# Patient Record
Sex: FEMALE | Race: BLACK OR AFRICAN AMERICAN | Employment: UNEMPLOYED | URBAN - METROPOLITAN AREA
[De-identification: names, ages, dates, MRNs, and addresses within clinical notes are randomized per-mention and may not be internally consistent; named-entity substitution may affect disease eponyms.]

---

## 2017-02-22 ENCOUNTER — HOSPITAL ENCOUNTER (OUTPATIENT)
Age: 47
Setting detail: SPECIMEN
Discharge: HOME | End: 2017-02-22
Payer: COMMERCIAL

## 2017-02-22 ENCOUNTER — OFFICE VISIT (OUTPATIENT)
Dept: FAMILY MEDICINE CLINIC | Facility: CLINIC | Age: 47
End: 2017-02-22

## 2017-02-22 VITALS
OXYGEN SATURATION: 99 % | SYSTOLIC BLOOD PRESSURE: 121 MMHG | HEART RATE: 63 BPM | BODY MASS INDEX: 28.98 KG/M2 | DIASTOLIC BLOOD PRESSURE: 72 MMHG | HEIGHT: 70 IN | WEIGHT: 202.4 LBS | TEMPERATURE: 96.6 F

## 2017-02-22 DIAGNOSIS — F17.200 SMOKES AND MOTIVATED TO QUIT: ICD-10-CM

## 2017-02-22 DIAGNOSIS — E66.3 OVERWEIGHT (BMI 25.0-29.9): ICD-10-CM

## 2017-02-22 DIAGNOSIS — N30.01 ACUTE CYSTITIS WITH HEMATURIA: ICD-10-CM

## 2017-02-22 DIAGNOSIS — R30.9 PAINFUL URINATION: ICD-10-CM

## 2017-02-22 DIAGNOSIS — J45.40 MODERATE PERSISTENT ASTHMA WITHOUT COMPLICATION: Primary | ICD-10-CM

## 2017-02-22 DIAGNOSIS — R25.2 MUSCLE CRAMPING: ICD-10-CM

## 2017-02-22 DIAGNOSIS — N30.00 ACUTE CYSTITIS WITHOUT HEMATURIA: ICD-10-CM

## 2017-02-22 DIAGNOSIS — Z13.31 POSITIVE DEPRESSION SCREENING: ICD-10-CM

## 2017-02-22 DIAGNOSIS — K21.9 GASTROESOPHAGEAL REFLUX DISEASE WITHOUT ESOPHAGITIS: ICD-10-CM

## 2017-02-22 DIAGNOSIS — R63.5 UNINTENDED WEIGHT GAIN: ICD-10-CM

## 2017-02-22 LAB
BILIRUBIN, POC: NEGATIVE
BLOOD URINE, POC: NORMAL
CLARITY, POC: CLEAR
COLOR, POC: YELLOW
GLUCOSE URINE, POC: NEGATIVE
KETONES, POC: NEGATIVE
LEUKOCYTE EST, POC: NORMAL
NITRITE, POC: NEGATIVE
PH, POC: 6
PROTEIN, POC: NORMAL
SPECIFIC GRAVITY, POC: 1.02
UROBILINOGEN, POC: NEGATIVE

## 2017-02-22 PROCEDURE — G8431 POS CLIN DEPRES SCRN F/U DOC: HCPCS | Performed by: FAMILY MEDICINE

## 2017-02-22 PROCEDURE — 99214 OFFICE O/P EST MOD 30 MIN: CPT | Performed by: FAMILY MEDICINE

## 2017-02-22 PROCEDURE — 81003 URINALYSIS AUTO W/O SCOPE: CPT | Performed by: FAMILY MEDICINE

## 2017-02-22 RX ORDER — NICOTINE 21 MG/24HR
1 PATCH, TRANSDERMAL 24 HOURS TRANSDERMAL EVERY 24 HOURS
Qty: 30 PATCH | Refills: 1 | Status: SHIPPED | OUTPATIENT
Start: 2017-02-22 | End: 2017-07-21 | Stop reason: SDUPTHER

## 2017-02-22 RX ORDER — FLUTICASONE PROPIONATE 220 UG/1
2 AEROSOL, METERED RESPIRATORY (INHALATION) 2 TIMES DAILY
Qty: 1 INHALER | Refills: 3 | Status: SHIPPED | OUTPATIENT
Start: 2017-02-22 | End: 2017-07-21 | Stop reason: SDUPTHER

## 2017-02-22 RX ORDER — LAMOTRIGINE 100 MG/1
TABLET ORAL
COMMUNITY
Start: 2017-01-18 | End: 2017-02-22 | Stop reason: ALTCHOICE

## 2017-02-22 RX ORDER — NITROFURANTOIN 25; 75 MG/1; MG/1
100 CAPSULE ORAL 2 TIMES DAILY
Qty: 20 CAPSULE | Refills: 0 | Status: SHIPPED | OUTPATIENT
Start: 2017-02-22 | End: 2017-03-04

## 2017-02-22 RX ORDER — CHLORHEXIDINE GLUCONATE 0.12 MG/ML
RINSE ORAL
Refills: 0 | COMMUNITY
Start: 2016-11-29 | End: 2017-02-22 | Stop reason: ALTCHOICE

## 2017-02-22 RX ORDER — LAMOTRIGINE 25 MG/1
TABLET ORAL
COMMUNITY
Start: 2016-11-29 | End: 2017-02-22 | Stop reason: ALTCHOICE

## 2017-02-22 RX ORDER — TRAZODONE HYDROCHLORIDE 100 MG/1
TABLET ORAL
COMMUNITY
Start: 2017-01-18 | End: 2017-02-22 | Stop reason: ALTCHOICE

## 2017-02-22 RX ORDER — PANTOPRAZOLE SODIUM 40 MG/1
40 TABLET, DELAYED RELEASE ORAL DAILY
Qty: 30 TABLET | Refills: 3 | Status: SHIPPED | OUTPATIENT
Start: 2017-02-22 | End: 2017-07-21 | Stop reason: ALTCHOICE

## 2017-02-22 RX ORDER — ALBUTEROL SULFATE 90 UG/1
2 AEROSOL, METERED RESPIRATORY (INHALATION) EVERY 6 HOURS PRN
Qty: 1 INHALER | Refills: 3 | Status: SHIPPED | OUTPATIENT
Start: 2017-02-22 | End: 2018-01-04 | Stop reason: SDUPTHER

## 2017-02-22 ASSESSMENT — ENCOUNTER SYMPTOMS
SHORTNESS OF BREATH: 1
CONSTIPATION: 0
ABDOMINAL PAIN: 0
CHEST TIGHTNESS: 0
NAUSEA: 0
DIARRHEA: 0
WHEEZING: 0
ABDOMINAL DISTENTION: 0
COUGH: 1
VOMITING: 0

## 2017-02-22 ASSESSMENT — PATIENT HEALTH QUESTIONNAIRE - PHQ9
SUM OF ALL RESPONSES TO PHQ9 QUESTIONS 1 & 2: 4
7. TROUBLE CONCENTRATING ON THINGS, SUCH AS READING THE NEWSPAPER OR WATCHING TELEVISION: 1
1. LITTLE INTEREST OR PLEASURE IN DOING THINGS: 3
6. FEELING BAD ABOUT YOURSELF - OR THAT YOU ARE A FAILURE OR HAVE LET YOURSELF OR YOUR FAMILY DOWN: 1
3. TROUBLE FALLING OR STAYING ASLEEP: 1
10. IF YOU CHECKED OFF ANY PROBLEMS, HOW DIFFICULT HAVE THESE PROBLEMS MADE IT FOR YOU TO DO YOUR WORK, TAKE CARE OF THINGS AT HOME, OR GET ALONG WITH OTHER PEOPLE: 1
2. FEELING DOWN, DEPRESSED OR HOPELESS: 1
5. POOR APPETITE OR OVEREATING: 1
9. THOUGHTS THAT YOU WOULD BE BETTER OFF DEAD, OR OF HURTING YOURSELF: 2
4. FEELING TIRED OR HAVING LITTLE ENERGY: 1
SUM OF ALL RESPONSES TO PHQ QUESTIONS 1-9: 12
8. MOVING OR SPEAKING SO SLOWLY THAT OTHER PEOPLE COULD HAVE NOTICED. OR THE OPPOSITE, BEING SO FIGETY OR RESTLESS THAT YOU HAVE BEEN MOVING AROUND A LOT MORE THAN USUAL: 1

## 2017-02-24 LAB
CULTURE: ABNORMAL
CULTURE: ABNORMAL
Lab: ABNORMAL
ORGANISM: ABNORMAL
SPECIMEN DESCRIPTION: ABNORMAL
STATUS: ABNORMAL

## 2017-02-26 PROBLEM — Z13.31 POSITIVE DEPRESSION SCREENING: Status: ACTIVE | Noted: 2017-02-26

## 2017-02-26 PROBLEM — R25.2 MUSCLE CRAMPING: Status: ACTIVE | Noted: 2017-02-26

## 2017-02-26 PROBLEM — R63.5 UNINTENDED WEIGHT GAIN: Status: ACTIVE | Noted: 2017-02-26

## 2017-03-09 DIAGNOSIS — K08.89 PAIN, DENTAL: ICD-10-CM

## 2017-03-09 RX ORDER — ACETAMINOPHEN 500 MG
TABLET ORAL
Qty: 120 TABLET | Refills: 0 | Status: SHIPPED | OUTPATIENT
Start: 2017-03-09 | End: 2017-04-10 | Stop reason: SDUPTHER

## 2017-03-22 ENCOUNTER — HOSPITAL ENCOUNTER (OUTPATIENT)
Age: 47
Discharge: HOME | End: 2017-03-22
Payer: COMMERCIAL

## 2017-03-22 ENCOUNTER — OFFICE VISIT (OUTPATIENT)
Dept: FAMILY MEDICINE CLINIC | Age: 47
End: 2017-03-22
Payer: COMMERCIAL

## 2017-03-22 VITALS
BODY MASS INDEX: 29.33 KG/M2 | WEIGHT: 198 LBS | HEIGHT: 69 IN | TEMPERATURE: 97.3 F | SYSTOLIC BLOOD PRESSURE: 130 MMHG | OXYGEN SATURATION: 100 % | DIASTOLIC BLOOD PRESSURE: 88 MMHG | HEART RATE: 68 BPM | RESPIRATION RATE: 18 BRPM

## 2017-03-22 DIAGNOSIS — Z95.828 PRESENCE OF IVC FILTER: ICD-10-CM

## 2017-03-22 DIAGNOSIS — E66.3 OVERWEIGHT (BMI 25.0-29.9): ICD-10-CM

## 2017-03-22 DIAGNOSIS — Z13.31 POSITIVE DEPRESSION SCREENING: ICD-10-CM

## 2017-03-22 DIAGNOSIS — Z13.6 SCREENING FOR CARDIOVASCULAR CONDITION: ICD-10-CM

## 2017-03-22 DIAGNOSIS — R35.0 FREQUENCY OF URINATION: ICD-10-CM

## 2017-03-22 DIAGNOSIS — R63.5 UNINTENDED WEIGHT GAIN: ICD-10-CM

## 2017-03-22 DIAGNOSIS — R25.2 MUSCLE CRAMPING: ICD-10-CM

## 2017-03-22 DIAGNOSIS — D50.0 IRON DEFICIENCY ANEMIA DUE TO CHRONIC BLOOD LOSS: ICD-10-CM

## 2017-03-22 DIAGNOSIS — N92.0 SPOTTING: ICD-10-CM

## 2017-03-22 DIAGNOSIS — Z23 NEED FOR TDAP VACCINATION: ICD-10-CM

## 2017-03-22 DIAGNOSIS — R10.84 GENERALIZED ABDOMINAL PAIN: ICD-10-CM

## 2017-03-22 DIAGNOSIS — J45.40 MODERATE PERSISTENT ASTHMA WITHOUT COMPLICATION: ICD-10-CM

## 2017-03-22 DIAGNOSIS — R19.8 ALTERNATING CONSTIPATION AND DIARRHEA: Primary | ICD-10-CM

## 2017-03-22 DIAGNOSIS — N30.01 ACUTE CYSTITIS WITH HEMATURIA: Primary | ICD-10-CM

## 2017-03-22 LAB
-: ABNORMAL
ALBUMIN SERPL-MCNC: 4.1 G/DL (ref 3.5–5.2)
ALBUMIN/GLOBULIN RATIO: NORMAL (ref 1–2.5)
ALP BLD-CCNC: 76 U/L (ref 35–104)
ALT SERPL-CCNC: 12 U/L (ref 5–33)
AMORPHOUS: ABNORMAL
ANION GAP SERPL CALCULATED.3IONS-SCNC: 10 MMOL/L (ref 9–17)
AST SERPL-CCNC: 26 U/L
BACTERIA: ABNORMAL
BILIRUB SERPL-MCNC: 0.33 MG/DL (ref 0.3–1.2)
BILIRUBIN URINE: NEGATIVE
BUN BLDV-MCNC: 14 MG/DL (ref 6–20)
BUN/CREAT BLD: NORMAL (ref 9–20)
CALCIUM SERPL-MCNC: 9.3 MG/DL (ref 8.6–10.4)
CASTS UA: ABNORMAL /LPF
CHLORIDE BLD-SCNC: 104 MMOL/L (ref 98–107)
CHOLESTEROL/HDL RATIO: 3.1
CHOLESTEROL: 148 MG/DL
CO2: 25 MMOL/L (ref 20–31)
COLOR: YELLOW
COMMENT UA: ABNORMAL
CREAT SERPL-MCNC: 0.61 MG/DL (ref 0.5–0.9)
CRYSTALS, UA: ABNORMAL /HPF
EPITHELIAL CELLS UA: ABNORMAL /HPF
GFR AFRICAN AMERICAN: >60 ML/MIN
GFR NON-AFRICAN AMERICAN: >60 ML/MIN
GFR SERPL CREATININE-BSD FRML MDRD: NORMAL ML/MIN/{1.73_M2}
GFR SERPL CREATININE-BSD FRML MDRD: NORMAL ML/MIN/{1.73_M2}
GLUCOSE BLD-MCNC: 89 MG/DL (ref 70–99)
GLUCOSE URINE: NEGATIVE
HCT VFR BLD CALC: 34.8 % (ref 36–46)
HDLC SERPL-MCNC: 48 MG/DL
HEMOGLOBIN: 11.3 G/DL (ref 12–16)
KETONES, URINE: NEGATIVE
LDL CHOLESTEROL: 85 MG/DL (ref 0–130)
LEUKOCYTE ESTERASE, URINE: NEGATIVE
MAGNESIUM: 2.1 MG/DL (ref 1.6–2.6)
MCH RBC QN AUTO: 28.6 PG (ref 26–34)
MCHC RBC AUTO-ENTMCNC: 32.4 G/DL (ref 31–37)
MCV RBC AUTO: 88.3 FL (ref 80–100)
MUCUS: ABNORMAL
NITRITE, URINE: NEGATIVE
OTHER OBSERVATIONS UA: ABNORMAL
PDW BLD-RTO: 14.5 % (ref 11.5–14.9)
PH UA: 5.5 (ref 5–8)
PLATELET # BLD: 211 K/UL (ref 150–450)
PMV BLD AUTO: 9.8 FL (ref 6–12)
POTASSIUM SERPL-SCNC: 3.9 MMOL/L (ref 3.7–5.3)
PROTEIN UA: NEGATIVE
RBC # BLD: 3.94 M/UL (ref 4–5.2)
RBC UA: ABNORMAL /HPF
RENAL EPITHELIAL, UA: ABNORMAL /HPF
SODIUM BLD-SCNC: 139 MMOL/L (ref 135–144)
SPECIFIC GRAVITY UA: 1.04 (ref 1–1.03)
TOTAL PROTEIN: 7.4 G/DL (ref 6.4–8.3)
TRICHOMONAS: ABNORMAL
TRIGL SERPL-MCNC: 76 MG/DL
TSH SERPL DL<=0.05 MIU/L-ACNC: 0.77 MIU/L (ref 0.3–5)
TURBIDITY: ABNORMAL
URINE HGB: ABNORMAL
UROBILINOGEN, URINE: NORMAL
VLDLC SERPL CALC-MCNC: NORMAL MG/DL (ref 1–30)
WBC # BLD: 5.4 K/UL (ref 3.5–11)
WBC UA: ABNORMAL /HPF
YEAST: ABNORMAL

## 2017-03-22 PROCEDURE — 87086 URINE CULTURE/COLONY COUNT: CPT

## 2017-03-22 PROCEDURE — 81001 URINALYSIS AUTO W/SCOPE: CPT

## 2017-03-22 PROCEDURE — 84443 ASSAY THYROID STIM HORMONE: CPT

## 2017-03-22 PROCEDURE — 85027 COMPLETE CBC AUTOMATED: CPT

## 2017-03-22 PROCEDURE — 36415 COLL VENOUS BLD VENIPUNCTURE: CPT

## 2017-03-22 PROCEDURE — 80053 COMPREHEN METABOLIC PANEL: CPT

## 2017-03-22 PROCEDURE — 99214 OFFICE O/P EST MOD 30 MIN: CPT | Performed by: FAMILY MEDICINE

## 2017-03-22 PROCEDURE — 80061 LIPID PANEL: CPT

## 2017-03-22 PROCEDURE — 83735 ASSAY OF MAGNESIUM: CPT

## 2017-03-22 RX ORDER — NITROFURANTOIN 25; 75 MG/1; MG/1
100 CAPSULE ORAL 2 TIMES DAILY
Qty: 14 CAPSULE | Refills: 0 | Status: SHIPPED | OUTPATIENT
Start: 2017-03-22 | End: 2017-03-29

## 2017-03-22 RX ORDER — FERROUS SULFATE 325(65) MG
325 TABLET ORAL DAILY
Qty: 30 TABLET | Refills: 3 | Status: SHIPPED | OUTPATIENT
Start: 2017-03-22 | End: 2017-07-21 | Stop reason: ALTCHOICE

## 2017-03-22 ASSESSMENT — ENCOUNTER SYMPTOMS
NAUSEA: 0
CONSTIPATION: 1
CHEST TIGHTNESS: 0
COUGH: 0
WHEEZING: 0
SHORTNESS OF BREATH: 1
DIARRHEA: 1
ABDOMINAL DISTENTION: 0
VOMITING: 0

## 2017-03-22 ASSESSMENT — ASTHMA QUESTIONNAIRES
QUESTION_1 LAST FOUR WEEKS HOW MUCH OF THE TIME DID YOUR ASTHMA KEEP YOU FROM GETTING AS MUCH DONE AT WORK, SCHOOL OR AT HOME: 3
QUESTION_3 LAST FOUR WEEKS HOW OFTEN DID YOUR ASTHMA SYMPTOMS (WHEEZING, COUGHING, SHORTNESS OF BREATH, CHEST TIGHTNESS OR PAIN) WAKE YOU UP AT NIGHT OR EARLIER THAN USUAL IN THE MORNING: 3
QUESTION_2 LAST FOUR WEEKS HOW OFTEN HAVE YOU HAD SHORTNESS OF BREATH: 2
QUESTION_4 LAST FOUR WEEKS HOW OFTEN HAVE YOU USED YOUR RESCUE INHALER OR NEBULIZER MEDICATION (SUCH AS ALBUTEROL): 2
ACT_TOTALSCORE: 13
QUESTION_5 LAST FOUR WEEKS HOW WOULD YOU RATE YOUR ASTHMA CONTROL: 3

## 2017-03-23 LAB
CULTURE: NORMAL
CULTURE: NORMAL
Lab: NORMAL
SPECIMEN DESCRIPTION: NORMAL
SPECIMEN DESCRIPTION: NORMAL
STATUS: NORMAL

## 2017-03-26 PROBLEM — R10.84 GENERALIZED ABDOMINAL PAIN: Status: ACTIVE | Noted: 2017-03-26

## 2017-03-26 PROBLEM — R19.8 ALTERNATING CONSTIPATION AND DIARRHEA: Status: ACTIVE | Noted: 2017-03-26

## 2017-03-26 PROBLEM — N92.0 SPOTTING: Status: ACTIVE | Noted: 2017-03-26

## 2017-03-26 ASSESSMENT — ENCOUNTER SYMPTOMS: ABDOMINAL PAIN: 1

## 2017-03-27 ENCOUNTER — OFFICE VISIT (OUTPATIENT)
Dept: FAMILY MEDICINE CLINIC | Age: 47
End: 2017-03-27
Payer: COMMERCIAL

## 2017-03-27 ENCOUNTER — HOSPITAL ENCOUNTER (OUTPATIENT)
Age: 47
Discharge: HOME | End: 2017-03-27
Payer: COMMERCIAL

## 2017-03-27 ENCOUNTER — HOSPITAL ENCOUNTER (OUTPATIENT)
Age: 47
Setting detail: SPECIMEN
Discharge: HOME | End: 2017-03-27
Payer: COMMERCIAL

## 2017-03-27 VITALS
BODY MASS INDEX: 28.06 KG/M2 | HEART RATE: 79 BPM | SYSTOLIC BLOOD PRESSURE: 144 MMHG | HEIGHT: 70 IN | RESPIRATION RATE: 18 BRPM | WEIGHT: 196 LBS | TEMPERATURE: 97.5 F | DIASTOLIC BLOOD PRESSURE: 103 MMHG

## 2017-03-27 DIAGNOSIS — I10 ESSENTIAL HYPERTENSION: ICD-10-CM

## 2017-03-27 DIAGNOSIS — B37.31 CANDIDAL VAGINITIS: ICD-10-CM

## 2017-03-27 DIAGNOSIS — D64.89 ANEMIA DUE TO OTHER CAUSE: ICD-10-CM

## 2017-03-27 DIAGNOSIS — N76.0 ACUTE VAGINITIS: Primary | ICD-10-CM

## 2017-03-27 LAB
FERRITIN: 131 UG/L (ref 13–150)
FOLATE: 15.9 NG/ML
HAPTOGLOBIN: 128 MG/DL (ref 30–200)
IRON SATURATION: 39 % (ref 20–55)
IRON: 116 UG/DL (ref 37–145)
LACTATE DEHYDROGENASE: 185 U/L (ref 135–214)
TOTAL IRON BINDING CAPACITY: 298 UG/DL (ref 250–450)
UNSATURATED IRON BINDING CAPACITY: 182 UG/DL (ref 112–347)
VITAMIN B-12: 596 PG/ML (ref 211–946)

## 2017-03-27 PROCEDURE — 83615 LACTATE (LD) (LDH) ENZYME: CPT

## 2017-03-27 PROCEDURE — 83550 IRON BINDING TEST: CPT

## 2017-03-27 PROCEDURE — 36415 COLL VENOUS BLD VENIPUNCTURE: CPT

## 2017-03-27 PROCEDURE — 82728 ASSAY OF FERRITIN: CPT

## 2017-03-27 PROCEDURE — 82746 ASSAY OF FOLIC ACID SERUM: CPT

## 2017-03-27 PROCEDURE — 82607 VITAMIN B-12: CPT

## 2017-03-27 PROCEDURE — 99214 OFFICE O/P EST MOD 30 MIN: CPT | Performed by: FAMILY MEDICINE

## 2017-03-27 PROCEDURE — 83540 ASSAY OF IRON: CPT

## 2017-03-27 PROCEDURE — 83010 ASSAY OF HAPTOGLOBIN QUANT: CPT

## 2017-03-27 RX ORDER — FLUCONAZOLE 150 MG/1
150 TABLET ORAL
Qty: 3 TABLET | Refills: 0 | Status: SHIPPED | OUTPATIENT
Start: 2017-03-27 | End: 2017-05-09 | Stop reason: ALTCHOICE

## 2017-03-29 DIAGNOSIS — A59.01 TRICHOMONAS VAGINITIS: Primary | ICD-10-CM

## 2017-03-29 LAB
C TRACH DNA GENITAL QL NAA+PROBE: NEGATIVE
DIRECT EXAM: ABNORMAL
Lab: ABNORMAL
N. GONORRHOEAE DNA: NEGATIVE
SPECIMEN DESCRIPTION: ABNORMAL
STATUS: ABNORMAL

## 2017-03-29 RX ORDER — METRONIDAZOLE 500 MG/1
2000 TABLET ORAL ONCE
Qty: 4 TABLET | Refills: 0 | Status: SHIPPED | OUTPATIENT
Start: 2017-03-29 | End: 2017-04-05 | Stop reason: ALTCHOICE

## 2017-03-29 RX ORDER — ONDANSETRON 4 MG/1
4 TABLET, FILM COATED ORAL EVERY 8 HOURS PRN
Qty: 10 TABLET | Refills: 0 | Status: SHIPPED | OUTPATIENT
Start: 2017-03-29 | End: 2017-04-05 | Stop reason: ALTCHOICE

## 2017-04-05 DIAGNOSIS — A59.9 TRICHOMONAS INFECTION: Primary | ICD-10-CM

## 2017-04-05 RX ORDER — CLINDAMYCIN HYDROCHLORIDE 300 MG/1
300 CAPSULE ORAL 2 TIMES DAILY
Qty: 20 CAPSULE | Refills: 0 | Status: SHIPPED | OUTPATIENT
Start: 2017-04-05 | End: 2017-04-15

## 2017-04-10 DIAGNOSIS — K08.89 PAIN, DENTAL: ICD-10-CM

## 2017-04-10 RX ORDER — ACETAMINOPHEN 500 MG
TABLET ORAL
Qty: 120 TABLET | Refills: 0 | Status: SHIPPED | OUTPATIENT
Start: 2017-04-10 | End: 2017-05-05 | Stop reason: SDUPTHER

## 2017-05-05 DIAGNOSIS — K08.89 PAIN, DENTAL: ICD-10-CM

## 2017-05-05 RX ORDER — ACETAMINOPHEN 500 MG
TABLET ORAL
Qty: 120 TABLET | Refills: 0 | Status: SHIPPED | OUTPATIENT
Start: 2017-05-05 | End: 2017-06-06 | Stop reason: SDUPTHER

## 2017-05-09 ENCOUNTER — OFFICE VISIT (OUTPATIENT)
Dept: INTERNAL MEDICINE | Age: 47
End: 2017-05-09
Payer: COMMERCIAL

## 2017-05-09 ENCOUNTER — HOSPITAL ENCOUNTER (OUTPATIENT)
Age: 47
Discharge: HOME | End: 2017-05-09
Payer: COMMERCIAL

## 2017-05-09 VITALS
WEIGHT: 197 LBS | HEIGHT: 69 IN | OXYGEN SATURATION: 99 % | HEART RATE: 75 BPM | BODY MASS INDEX: 29.18 KG/M2 | DIASTOLIC BLOOD PRESSURE: 91 MMHG | SYSTOLIC BLOOD PRESSURE: 134 MMHG

## 2017-05-09 DIAGNOSIS — Z00.00 HEALTHCARE MAINTENANCE: ICD-10-CM

## 2017-05-09 DIAGNOSIS — Z86.718 HISTORY OF DVT (DEEP VEIN THROMBOSIS): ICD-10-CM

## 2017-05-09 DIAGNOSIS — K21.9 GASTROESOPHAGEAL REFLUX DISEASE WITHOUT ESOPHAGITIS: ICD-10-CM

## 2017-05-09 DIAGNOSIS — D50.8 OTHER IRON DEFICIENCY ANEMIA: ICD-10-CM

## 2017-05-09 DIAGNOSIS — R53.82 CHRONIC FATIGUE: ICD-10-CM

## 2017-05-09 DIAGNOSIS — F31.9 BIPOLAR 1 DISORDER (HCC): Primary | ICD-10-CM

## 2017-05-09 LAB
ALBUMIN SERPL-MCNC: 4.2 G/DL (ref 3.5–5.2)
ALBUMIN/GLOBULIN RATIO: 1.1 (ref 1–2.5)
ALP BLD-CCNC: 89 U/L (ref 35–104)
ALT SERPL-CCNC: 24 U/L (ref 5–33)
ANION GAP SERPL CALCULATED.3IONS-SCNC: 14 MMOL/L (ref 9–17)
AST SERPL-CCNC: 26 U/L
BILIRUB SERPL-MCNC: 0.31 MG/DL (ref 0.3–1.2)
BUN BLDV-MCNC: 9 MG/DL (ref 6–20)
BUN/CREAT BLD: NORMAL (ref 9–20)
CALCIUM SERPL-MCNC: 9.1 MG/DL (ref 8.6–10.4)
CHLORIDE BLD-SCNC: 104 MMOL/L (ref 98–107)
CHOLESTEROL/HDL RATIO: 2.8
CHOLESTEROL: 172 MG/DL
CO2: 22 MMOL/L (ref 20–31)
CREAT SERPL-MCNC: 0.67 MG/DL (ref 0.5–0.9)
GFR AFRICAN AMERICAN: >60 ML/MIN
GFR NON-AFRICAN AMERICAN: >60 ML/MIN
GFR SERPL CREATININE-BSD FRML MDRD: NORMAL ML/MIN/{1.73_M2}
GFR SERPL CREATININE-BSD FRML MDRD: NORMAL ML/MIN/{1.73_M2}
GLUCOSE BLD-MCNC: 92 MG/DL (ref 70–99)
HCT VFR BLD CALC: 37.6 % (ref 36–46)
HDLC SERPL-MCNC: 61 MG/DL
HEMOGLOBIN: 12.1 G/DL (ref 12–16)
IRON SATURATION: 29 % (ref 20–55)
IRON: 83 UG/DL (ref 37–145)
LDL CHOLESTEROL: 87 MG/DL (ref 0–130)
MCH RBC QN AUTO: 28 PG (ref 26–34)
MCHC RBC AUTO-ENTMCNC: 32.3 G/DL (ref 31–37)
MCV RBC AUTO: 86.8 FL (ref 80–100)
PDW BLD-RTO: 14.4 % (ref 12.5–15.4)
PLATELET # BLD: 252 K/UL (ref 140–450)
PMV BLD AUTO: 8.5 FL (ref 6–12)
POTASSIUM SERPL-SCNC: 4.4 MMOL/L (ref 3.7–5.3)
RBC # BLD: 4.34 M/UL (ref 4–5.2)
SODIUM BLD-SCNC: 140 MMOL/L (ref 135–144)
TOTAL IRON BINDING CAPACITY: 287 UG/DL (ref 250–450)
TOTAL PROTEIN: 8.1 G/DL (ref 6.4–8.3)
TRIGL SERPL-MCNC: 119 MG/DL
TSH SERPL DL<=0.05 MIU/L-ACNC: 1.96 MIU/L (ref 0.3–5)
UNSATURATED IRON BINDING CAPACITY: 204 UG/DL (ref 112–347)
VLDLC SERPL CALC-MCNC: NORMAL MG/DL (ref 1–30)
WBC # BLD: 6.2 K/UL (ref 3.5–11)

## 2017-05-09 PROCEDURE — 80053 COMPREHEN METABOLIC PANEL: CPT

## 2017-05-09 PROCEDURE — 36415 COLL VENOUS BLD VENIPUNCTURE: CPT

## 2017-05-09 PROCEDURE — 83540 ASSAY OF IRON: CPT

## 2017-05-09 PROCEDURE — 99204 OFFICE O/P NEW MOD 45 MIN: CPT | Performed by: NURSE PRACTITIONER

## 2017-05-09 PROCEDURE — 85027 COMPLETE CBC AUTOMATED: CPT

## 2017-05-09 PROCEDURE — 83550 IRON BINDING TEST: CPT

## 2017-05-09 PROCEDURE — 84443 ASSAY THYROID STIM HORMONE: CPT

## 2017-05-09 PROCEDURE — 80061 LIPID PANEL: CPT

## 2017-06-06 DIAGNOSIS — K08.89 PAIN, DENTAL: ICD-10-CM

## 2017-06-06 RX ORDER — ACETAMINOPHEN 500 MG
TABLET ORAL
Qty: 120 TABLET | Refills: 0 | Status: SHIPPED | OUTPATIENT
Start: 2017-06-06 | End: 2017-07-21 | Stop reason: SDUPTHER

## 2017-07-19 ENCOUNTER — TELEPHONE (OUTPATIENT)
Dept: ADMINISTRATIVE | Age: 47
End: 2017-07-19

## 2017-07-19 DIAGNOSIS — K08.89 PAIN, DENTAL: ICD-10-CM

## 2017-07-21 ENCOUNTER — OFFICE VISIT (OUTPATIENT)
Dept: FAMILY MEDICINE CLINIC | Age: 47
End: 2017-07-21
Payer: COMMERCIAL

## 2017-07-21 VITALS
WEIGHT: 200.6 LBS | OXYGEN SATURATION: 98 % | RESPIRATION RATE: 17 BRPM | SYSTOLIC BLOOD PRESSURE: 139 MMHG | TEMPERATURE: 98.9 F | HEART RATE: 76 BPM | DIASTOLIC BLOOD PRESSURE: 90 MMHG | BODY MASS INDEX: 30.4 KG/M2 | HEIGHT: 68 IN

## 2017-07-21 DIAGNOSIS — I10 ESSENTIAL HYPERTENSION: ICD-10-CM

## 2017-07-21 DIAGNOSIS — R53.82 CHRONIC FATIGUE: ICD-10-CM

## 2017-07-21 DIAGNOSIS — E66.9 OBESITY (BMI 30.0-34.9): ICD-10-CM

## 2017-07-21 DIAGNOSIS — Z23 NEED FOR TDAP VACCINATION: ICD-10-CM

## 2017-07-21 DIAGNOSIS — F17.200 SMOKES AND MOTIVATED TO QUIT: ICD-10-CM

## 2017-07-21 DIAGNOSIS — M25.512 ACUTE PAIN OF LEFT SHOULDER: ICD-10-CM

## 2017-07-21 DIAGNOSIS — Z29.9 PREVENTIVE MEASURE: ICD-10-CM

## 2017-07-21 DIAGNOSIS — J45.40 MODERATE PERSISTENT ASTHMA WITHOUT COMPLICATION: Primary | ICD-10-CM

## 2017-07-21 PROBLEM — N76.0 ACUTE VAGINITIS: Status: RESOLVED | Noted: 2017-03-27 | Resolved: 2017-07-21

## 2017-07-21 PROBLEM — R10.84 GENERALIZED ABDOMINAL PAIN: Status: RESOLVED | Noted: 2017-03-26 | Resolved: 2017-07-21

## 2017-07-21 PROBLEM — R25.2 MUSCLE CRAMPING: Status: RESOLVED | Noted: 2017-02-26 | Resolved: 2017-07-21

## 2017-07-21 PROBLEM — N92.0 SPOTTING: Status: RESOLVED | Noted: 2017-03-26 | Resolved: 2017-07-21

## 2017-07-21 PROCEDURE — 99214 OFFICE O/P EST MOD 30 MIN: CPT | Performed by: FAMILY MEDICINE

## 2017-07-21 RX ORDER — AMLODIPINE BESYLATE 5 MG/1
5 TABLET ORAL DAILY
Qty: 30 TABLET | Refills: 3 | Status: SHIPPED | OUTPATIENT
Start: 2017-07-21 | End: 2017-10-18 | Stop reason: SDUPTHER

## 2017-07-21 RX ORDER — ACETAMINOPHEN 500 MG
TABLET ORAL
Qty: 120 TABLET | Refills: 0 | Status: SHIPPED | OUTPATIENT
Start: 2017-07-21 | End: 2017-09-14 | Stop reason: SDUPTHER

## 2017-07-21 RX ORDER — MULTIVIT-MIN/IRON FUM/FOLIC AC 7.5 MG-4
1 TABLET ORAL DAILY
Qty: 30 TABLET | Refills: 3 | Status: SHIPPED | OUTPATIENT
Start: 2017-07-21 | End: 2018-01-04 | Stop reason: ALTCHOICE

## 2017-07-21 RX ORDER — FLUTICASONE PROPIONATE 220 UG/1
2 AEROSOL, METERED RESPIRATORY (INHALATION) 2 TIMES DAILY
Qty: 1 INHALER | Refills: 3 | Status: SHIPPED | OUTPATIENT
Start: 2017-07-21 | End: 2018-02-06 | Stop reason: SDUPTHER

## 2017-07-21 RX ORDER — CYCLOBENZAPRINE HCL 5 MG
5 TABLET ORAL NIGHTLY PRN
Qty: 30 TABLET | Refills: 0 | Status: SHIPPED | OUTPATIENT
Start: 2017-07-21 | End: 2019-06-14 | Stop reason: ALTCHOICE

## 2017-07-21 RX ORDER — NICOTINE 21 MG/24HR
1 PATCH, TRANSDERMAL 24 HOURS TRANSDERMAL EVERY 24 HOURS
Qty: 30 PATCH | Refills: 1 | Status: SHIPPED | OUTPATIENT
Start: 2017-07-21 | End: 2017-10-18 | Stop reason: SDUPTHER

## 2017-07-21 RX ORDER — BLOOD PRESSURE TEST KIT
KIT MISCELLANEOUS
Qty: 1 KIT | Refills: 0 | Status: SHIPPED | OUTPATIENT
Start: 2017-07-21 | End: 2017-10-23 | Stop reason: ALTCHOICE

## 2017-07-21 ASSESSMENT — ASTHMA QUESTIONNAIRES
QUESTION_4 LAST FOUR WEEKS HOW OFTEN HAVE YOU USED YOUR RESCUE INHALER OR NEBULIZER MEDICATION (SUCH AS ALBUTEROL): 1
QUESTION_3 LAST FOUR WEEKS HOW OFTEN DID YOUR ASTHMA SYMPTOMS (WHEEZING, COUGHING, SHORTNESS OF BREATH, CHEST TIGHTNESS OR PAIN) WAKE YOU UP AT NIGHT OR EARLIER THAN USUAL IN THE MORNING: 2
QUESTION_2 LAST FOUR WEEKS HOW OFTEN HAVE YOU HAD SHORTNESS OF BREATH: 4
QUESTION_1 LAST FOUR WEEKS HOW MUCH OF THE TIME DID YOUR ASTHMA KEEP YOU FROM GETTING AS MUCH DONE AT WORK, SCHOOL OR AT HOME: 3
QUESTION_5 LAST FOUR WEEKS HOW WOULD YOU RATE YOUR ASTHMA CONTROL: 3
ACT_TOTALSCORE: 13

## 2017-07-21 ASSESSMENT — ENCOUNTER SYMPTOMS
CONSTIPATION: 0
VOMITING: 0
DIARRHEA: 0
WHEEZING: 0
ABDOMINAL PAIN: 0
COUGH: 0
SHORTNESS OF BREATH: 1
NAUSEA: 0
CHEST TIGHTNESS: 0
ABDOMINAL DISTENTION: 0

## 2017-08-07 ENCOUNTER — NURSE ONLY (OUTPATIENT)
Dept: FAMILY MEDICINE CLINIC | Age: 47
End: 2017-08-07
Payer: COMMERCIAL

## 2017-08-07 VITALS — SYSTOLIC BLOOD PRESSURE: 128 MMHG | DIASTOLIC BLOOD PRESSURE: 85 MMHG | HEART RATE: 73 BPM

## 2017-08-07 DIAGNOSIS — I10 ESSENTIAL HYPERTENSION: Primary | ICD-10-CM

## 2017-08-07 PROCEDURE — 99211 OFF/OP EST MAY X REQ PHY/QHP: CPT | Performed by: FAMILY MEDICINE

## 2017-09-14 DIAGNOSIS — M25.512 ACUTE PAIN OF LEFT SHOULDER: ICD-10-CM

## 2017-09-14 RX ORDER — ACETAMINOPHEN 500 MG
TABLET ORAL
Qty: 120 TABLET | Refills: 0 | Status: SHIPPED | OUTPATIENT
Start: 2017-09-14 | End: 2017-10-18 | Stop reason: SDUPTHER

## 2017-10-18 DIAGNOSIS — M25.512 ACUTE PAIN OF LEFT SHOULDER: ICD-10-CM

## 2017-10-18 DIAGNOSIS — I10 ESSENTIAL HYPERTENSION: ICD-10-CM

## 2017-10-18 DIAGNOSIS — F17.200 SMOKES AND MOTIVATED TO QUIT: ICD-10-CM

## 2017-10-18 RX ORDER — ACETAMINOPHEN 500 MG
TABLET ORAL
Qty: 120 TABLET | Refills: 0 | Status: SHIPPED | OUTPATIENT
Start: 2017-10-18 | End: 2017-11-22 | Stop reason: SDUPTHER

## 2017-10-18 RX ORDER — AMLODIPINE BESYLATE 5 MG/1
5 TABLET ORAL DAILY
Qty: 30 TABLET | Refills: 3 | Status: SHIPPED | OUTPATIENT
Start: 2017-10-18 | End: 2018-02-06 | Stop reason: SDUPTHER

## 2017-10-18 RX ORDER — NICOTINE 21 MG/24HR
PATCH, TRANSDERMAL 24 HOURS TRANSDERMAL
Qty: 28 PATCH | Refills: 0 | Status: SHIPPED | OUTPATIENT
Start: 2017-10-18 | End: 2018-01-04 | Stop reason: ALTCHOICE

## 2017-10-23 ENCOUNTER — OFFICE VISIT (OUTPATIENT)
Dept: FAMILY MEDICINE CLINIC | Age: 47
End: 2017-10-23
Payer: COMMERCIAL

## 2017-10-23 VITALS
HEART RATE: 71 BPM | WEIGHT: 193.2 LBS | HEIGHT: 68 IN | OXYGEN SATURATION: 98 % | DIASTOLIC BLOOD PRESSURE: 88 MMHG | BODY MASS INDEX: 29.28 KG/M2 | SYSTOLIC BLOOD PRESSURE: 131 MMHG | TEMPERATURE: 97.5 F

## 2017-10-23 DIAGNOSIS — Z87.891 EX-SMOKER: ICD-10-CM

## 2017-10-23 DIAGNOSIS — J45.40 MODERATE PERSISTENT ASTHMA WITHOUT COMPLICATION: ICD-10-CM

## 2017-10-23 DIAGNOSIS — Z12.31 ENCOUNTER FOR SCREENING MAMMOGRAM FOR BREAST CANCER: ICD-10-CM

## 2017-10-23 DIAGNOSIS — I10 ESSENTIAL HYPERTENSION: Primary | ICD-10-CM

## 2017-10-23 DIAGNOSIS — F33.1 MODERATE EPISODE OF RECURRENT MAJOR DEPRESSIVE DISORDER (HCC): ICD-10-CM

## 2017-10-23 PROCEDURE — 4004F PT TOBACCO SCREEN RCVD TLK: CPT | Performed by: FAMILY MEDICINE

## 2017-10-23 PROCEDURE — G8427 DOCREV CUR MEDS BY ELIG CLIN: HCPCS | Performed by: FAMILY MEDICINE

## 2017-10-23 PROCEDURE — 99214 OFFICE O/P EST MOD 30 MIN: CPT | Performed by: FAMILY MEDICINE

## 2017-10-23 PROCEDURE — G8484 FLU IMMUNIZE NO ADMIN: HCPCS | Performed by: FAMILY MEDICINE

## 2017-10-23 PROCEDURE — G8417 CALC BMI ABV UP PARAM F/U: HCPCS | Performed by: FAMILY MEDICINE

## 2017-10-23 ASSESSMENT — ASTHMA QUESTIONNAIRES
QUESTION_4 LAST FOUR WEEKS HOW OFTEN HAVE YOU USED YOUR RESCUE INHALER OR NEBULIZER MEDICATION (SUCH AS ALBUTEROL): 4
QUESTION_2 LAST FOUR WEEKS HOW OFTEN HAVE YOU HAD SHORTNESS OF BREATH: 4
QUESTION_5 LAST FOUR WEEKS HOW WOULD YOU RATE YOUR ASTHMA CONTROL: 4
QUESTION_3 LAST FOUR WEEKS HOW OFTEN DID YOUR ASTHMA SYMPTOMS (WHEEZING, COUGHING, SHORTNESS OF BREATH, CHEST TIGHTNESS OR PAIN) WAKE YOU UP AT NIGHT OR EARLIER THAN USUAL IN THE MORNING: 3
QUESTION_1 LAST FOUR WEEKS HOW MUCH OF THE TIME DID YOUR ASTHMA KEEP YOU FROM GETTING AS MUCH DONE AT WORK, SCHOOL OR AT HOME: 5
ACT_TOTALSCORE: 20

## 2017-10-23 ASSESSMENT — ENCOUNTER SYMPTOMS
DIARRHEA: 0
COUGH: 0
VOMITING: 0
SHORTNESS OF BREATH: 0
CONSTIPATION: 0
WHEEZING: 0
NAUSEA: 0
ABDOMINAL PAIN: 0
ABDOMINAL DISTENTION: 0
CHEST TIGHTNESS: 0

## 2017-10-23 NOTE — PROGRESS NOTES
Chief Complaint   Patient presents with    Hypertension    Asthma         Don Pimentel  here today for follow up on chronic medical problems, go over labs and/or diagnostic studies, and medication refills. Hypertension and Asthma      HPI    Hypertension: Patient here for follow-up of elevated blood pressure. she   is exercising, biking 7-8 miles, and is adherent to low salt diet. Blood pressure is well controlled at home . Cardiac symptoms fatigue. Patient denies chest pain, chest pressure/discomfort, claudication, dyspnea, exertional chest pressure/discomfort, fatigue, irregular heart beat, lower extremity edema, near-syncope, orthopnea, palpitations, paroxysmal nocturnal dyspnea, syncope and tachypnea. Cardiovascular risk factors: hypertension, obesity (BMI >= 30 kg/m2) and smoking/ tobacco exposure. Use of agents associated with hypertension: none. History of target organ damage: none. Had Echo 2-D on 1/20/16 EF 55%  EKG from 2/25/16 mild bradycardia otherwise within normal limits      BP controlled. Don reports compliance with BP medications, and tolerates them well, denies side effects. BP Readings from Last 3 Encounters:   10/23/17 131/88   08/07/17 128/85   07/21/17 (!) 139/90      Pulse controlled. Pulse Readings from Last 3 Encounters:   10/23/17 71   08/07/17 73   07/21/17 76     Patient intentionally lost 7 pounds in 3 months  Wt Readings from Last 3 Encounters:   10/23/17 193 lb 3.2 oz (87.6 kg)   07/21/17 200 lb 9.6 oz (91 kg)   05/09/17 197 lb (89.4 kg)         Asthma: Don Pimentel is here evaluation of asthma. Patient's symptoms include NONE. Associated symptoms include fatigue, improved from prior. Denies dyspnea on exertion. Denies cough, wheezing or chest tightness. The patient has been suffering from these symptoms for approximately several years    Symptoms have been gradually improving since she quit smoking. Patient was restarted on Flovent at her last appointment. Didn't do the pulmonary function test, I reprinted order and advised patient to do it. Nicotine dependence. Smoker, counseling given to quit smoking. Counseling given: Yes  Quit smoking 2 weeks ago    ACT score greatly improved, praise given      ASTHMA CONTROL TEST 10/23/2017 7/21/2017 3/22/2017   In the past 4 weeks, how much of the time did your asthma keep you from getting as much done at work, school or at home? 5 3 3   During the past 4 weeks, how often have you had shortness of breath? 4 4 2   During the past 4 weeks, how often did your asthma symptoms (wheezing, coughing, shortness of breath, chest tightness or pain) wake you up at night or earlier than usual in the morning? 3 2 3   During the past 4 weeks, how often have you used your rescue inhaler or nebulizer medication (such as albuterol)? 4 1 2   How would you rate your asthma control during the past 4 weeks? 4 3 3   Asthma Control Test Total Score 20 13 13         -vital signs stable and within normal limits except Overweight per BMI. /88   Pulse 71   Temp 97.5 °F (36.4 °C) (Tympanic)   Ht 5' 8\" (1.727 m)   Wt 193 lb 3.2 oz (87.6 kg)   LMP  (LMP Unknown)   SpO2 98% Comment: ra @ rest  Breastfeeding? No   BMI 29.38 kg/m²   Body mass index is 29.38 kg/m². Has moderate depression. Goes to [facility] and she was started on new medication, possibility Abilify, not sure. Patient was advised to bring her list of medication at the next appointment. Patient reports that since she is running the bike, she feels much better. Denies suicidal ideation, plan or intent. PHQ Scores 2/22/2017   PHQ2 Score 4   PHQ9 Score 12      []1-4 = Minimal depression   []5-9 = Mild depression   [x]10-14 = Moderate depression   []15-19 = Moderately severe depression   []20-27 = Severe depression    Discussed most recent testing with the patient and all questions fully answered.   Labs within normal limits   Hospital Outpatient Visit on 05/09/2017 Component Date Value Ref Range Status    WBC 05/09/2017 6.2  3.5 - 11.0 k/uL Final    RBC 05/09/2017 4.34  4.0 - 5.2 m/uL Final    Hemoglobin 05/09/2017 12.1  12.0 - 16.0 g/dL Final    Hematocrit 05/09/2017 37.6  36 - 46 % Final    MCV 05/09/2017 86.8  80 - 100 fL Final    MCH 05/09/2017 28.0  26 - 34 pg Final    MCHC 05/09/2017 32.3  31 - 37 g/dL Final    RDW 05/09/2017 14.4  12.5 - 15.4 % Final    Platelets 37/37/5951 252  140 - 450 k/uL Final    MPV 05/09/2017 8.5  6.0 - 12.0 fL Final    Glucose 05/09/2017 92  70 - 99 mg/dL Final    BUN 05/09/2017 9  6 - 20 mg/dL Final    CREATININE 05/09/2017 0.67  0.50 - 0.90 mg/dL Final    Bun/Cre Ratio 05/09/2017 NOT REPORTED  9 - 20 Final    Calcium 05/09/2017 9.1  8.6 - 10.4 mg/dL Final    Sodium 05/09/2017 140  135 - 144 mmol/L Final    Potassium 05/09/2017 4.4  3.7 - 5.3 mmol/L Final    Chloride 05/09/2017 104  98 - 107 mmol/L Final    CO2 05/09/2017 22  20 - 31 mmol/L Final    Anion Gap 05/09/2017 14  9 - 17 mmol/L Final    Alkaline Phosphatase 05/09/2017 89  35 - 104 U/L Final    ALT 05/09/2017 24  5 - 33 U/L Final    AST 05/09/2017 26  <32 U/L Final    Total Bilirubin 05/09/2017 0.31  0.3 - 1.2 mg/dL Final    Total Protein 05/09/2017 8.1  6.4 - 8.3 g/dL Final    Alb 05/09/2017 4.2  3.5 - 5.2 g/dL Final    Albumin/Globulin Ratio 05/09/2017 1.1  1.0 - 2.5 Final    GFR Non- 05/09/2017 >60  >60 mL/min Final    GFR  05/09/2017 >60  >60 mL/min Final    GFR Comment 05/09/2017        Final    Comment: Average GFR for 38-51 years old:   80 mL/min/1.73sq m  Chronic Kidney Disease:   <60 mL/min/1.73sq m  Kidney failure:   <15 mL/min/1.73sq m              eGFR calculated using average adult body mass.  Additional eGFR calculator   available at:        [email or web address]        [address] [phone]      GFR Staging 05/09/2017 NOT REPORTED   Final  Cholesterol 05/09/2017 172  <200 mg/dL Final    Comment:    Cholesterol Guidelines:      <200  Desirable   200-240  Borderline      >240  Undesirable         HDL 05/09/2017 61  >40 mg/dL Final    Comment:    HDL Guidelines:    <40     Undesirable   40-59    Borderline    >59     Desirable         LDL Cholesterol 05/09/2017 87  0 - 130 mg/dL Final    Comment:    LDL Guidelines:     <100    Desirable   100-129   Near to/above Desirable   130-159   Borderline      >159   Undesirable     Direct (measured) LDL and calculated LDL are not interchangeable tests.  Chol/HDL Ratio 05/09/2017 2.8  <5 Final    Triglycerides 05/09/2017 119  <150 mg/dL Final    Comment:    Triglyceride Guidelines:     <150   Desirable   150-199  Borderline   200-499  High     >499   Very high   Based on AHA Guidelines for fasting triglyceride, October 2012. [facility] [id number] [place] [address], [address] [place] [address] [phone]      VLDL 05/09/2017 NOT REPORTED  1 - 30 mg/dL Final    TSH 05/09/2017 1.96  0.30 - 5.00 mIU/L Final    Iron 05/09/2017 83  37 - 145 ug/dL Final    TIBC 05/09/2017 287  250 - 450 ug/dL Final    Iron Saturation 05/09/2017 29  20 - 55 % Final    UIBC 05/09/2017 204  112 - 347 ug/dL Final           Current Outpatient Prescriptions   Medication Sig Dispense Refill    nicotine (NICODERM CQ) 14 MG/24HR APPLY ONE PATCH TO SKIN DAILY 28 patch 0    acetaminophen (MAPAP) 500 MG tablet TAKE 1 TABLET BY MOUTH EVERY 6 HOURS AS NEEDED FOR PAIN 120 tablet 0    amLODIPine (NORVASC) 5 MG tablet Take 1 tablet by mouth daily 30 tablet 3    fluticasone (FLOVENT HFA) 220 MCG/ACT inhaler Inhale 2 puffs into the lungs 2 times daily 1 Inhaler 3    Blood Pressure KIT Diagnosis: HTN. Needs to check blood pressure 1-2 times a day until stable, then once a day.  Goal blood pressure less than 135/85, and above 110/60. 1 kit 0    Multiple Vitamins-Minerals (MULTIVITAMIN WITH MINERALS) tablet Take 1 tablet by mouth daily 30 tablet 3  cyclobenzaprine (FLEXERIL) 5 MG tablet Take 1 tablet by mouth nightly as needed for Muscle spasms 30 tablet 0    albuterol sulfate HFA (PROAIR HFA) 108 (90 BASE) MCG/ACT inhaler Inhale 2 puffs into the lungs every 6 hours as needed for Wheezing 1 Inhaler 3    EPINEPHrine (EPIPEN 2-MAGDI) 0.3 MG/0.3ML SOAJ injection Inject 0.3 mLs into the muscle once as needed And call 911 1 each 3    albuterol (PROVENTIL) (2.5 MG/3ML) 0.083% nebulizer solution Take 3 mLs by nebulization every 6 hours as needed for Wheezing 120 each 3     No current facility-administered medications for this visit. Social History     Social History    Marital status:      Spouse name: N/A    Number of children: N/A    Years of education: N/A     Occupational History    Not on file. Social History Main Topics    Smoking status: Former Smoker     Years: 18.00     Types: Cigarettes     Start date: 1/1/1996     Quit date: 10/9/2017    Smokeless tobacco: Never Used      Comment: 1/2-1 PPD, quit smoking 2 weeks ago per my note from 10/23/17    Alcohol use No      Comment: quit smoking with patches    Drug use:      Types: Marijuana    Sexual activity: Yes     Partners: Male     Other Topics Concern    Not on file     Social History Narrative    No narrative on file     Counseling given: Yes        Family History   Problem Relation Age of Onset    Anemia Sister     Cancer Maternal Grandmother      breast cancer    Cancer Maternal Grandfather      liver cancer    Cancer Maternal Aunt      stomach cancer             -rest of complaints with corresponding details per ROS    The patient's past medical, surgical, social, and family history as well as her current medications and allergies were reviewed as documented in today's encounter. Review of Systems   Constitutional: Positive for fatigue. Negative for activity change, appetite change, chills, diaphoresis, fever and unexpected weight change.    Respiratory: Negative for cough, chest tightness, shortness of breath and wheezing. Cardiovascular: Negative for chest pain, palpitations and leg swelling. Gastrointestinal: Negative for abdominal distention, abdominal pain, constipation, diarrhea, nausea and vomiting. Genitourinary:        Urine has a strong odor   Allergic/Immunologic: Negative for environmental allergies. Psychiatric/Behavioral: Positive for dysphoric mood. Negative for self-injury, sleep disturbance and suicidal ideas. The patient is nervous/anxious. The patient is not hyperactive. Goes to [employer]           Physical Exam   Constitutional: She is oriented to person, place, and time. She appears well-developed and well-nourished. No distress. HENT:   Head: Normocephalic and atraumatic. Mouth/Throat: Oropharynx is clear and moist. No oropharyngeal exudate. Eyes: Conjunctivae and EOM are normal. Right eye exhibits no discharge. Left eye exhibits no discharge. No scleral icterus. Neck: Normal range of motion. Neck supple. No thyromegaly present. Cardiovascular: Normal rate, regular rhythm, normal heart sounds and intact distal pulses. Pulmonary/Chest: Effort normal and breath sounds normal. No respiratory distress. She has no wheezes. She has no rales. She exhibits no tenderness. Abdominal: Soft. Bowel sounds are normal. She exhibits no distension. There is no tenderness. Musculoskeletal: Normal range of motion. She exhibits no edema or tenderness. Neurological: She is alert and oriented to person, place, and time. No cranial nerve deficit. She exhibits normal muscle tone. Skin: Skin is warm and dry. No rash noted. She is not diaphoretic. Psychiatric: Her behavior is normal. Judgment and thought content normal.   Nursing note and vitals reviewed. ASSESSMENT AND PLAN      1.  Essential hypertension  Controlled  Discussed low salt diet and BP and pulse monitoring daily, BP log given  Continue Norvasc 5 MG by mouth daily  - CBC; Future  - Comprehensive Metabolic Panel; Future  - UA W/REFLEX CULTURE; Future    2. Moderate persistent asthma without complication  Controlled, greatly improved from prior  Recently quit smoking which has been helping with asthma  Continue Flovent , 2 puffs twice a day, rinse after use. Albuterol when necessary    Reprinted referral to PFTs and advised patient to schedule appointment with specialist.     3. Ex-smoker  Praise given  I emphasized to patient not to restart smoking  Emphasized to patient greatly improved asthma due to her abstaining from smoking  Patient says she will try not to smoke again    4. Moderate episode of recurrent major depressive disorder ([facility] [address]  improved from prior   She follows with [facility] possibility Abilify, not sure, she will bring the list of medications at the next appointment. 5. Encounter for screening mammogram for breast cancer    - [facility] DIGITAL SCREEN W OR WO CAD BILATERAL; Future      Patient reports she had HIV screening done at the health department, we still didn't obtain the report, I requested it again. Orders Placed This Encounter   Procedures    KEVON DIGITAL SCREEN W OR WO CAD BILATERAL     Standing Status:   Future     Standing Expiration Date:   12/23/2018    CBC     Standing Status:   Future     Standing Expiration Date:   10/23/2018    Comprehensive Metabolic Panel     Standing Status:   Future     Standing Expiration Date:   10/23/2018    UA W/REFLEX CULTURE     Culture and sensitivity     Standing Status:   Future     Standing Expiration Date:   10/23/2018         Medications Discontinued During This Encounter   Medication Reason    Blood Pressure KIT Therapy completed       Don received counseling on the following healthy behaviors: nutrition, exercise, medication adherence and tobacco cessation  Reviewed prior labs and health maintenance  Continue current medications, diet and exercise.   Discussed use, benefit, and side This note was completed by using the assistance of a speech-recognition program. However, inadvertent computerized transcription errors may be present. Although every effort was made to ensure accuracy, no guarantees can be provided that every mistake has been identified and corrected by editing.   Electronically signed by Joss Hartley MD on 10/29/2017  10:13 AM

## 2017-10-29 PROBLEM — Z23 NEED FOR TDAP VACCINATION: Status: RESOLVED | Noted: 2017-07-21 | Resolved: 2017-10-29

## 2017-10-29 PROBLEM — Z87.891 EX-SMOKER: Status: ACTIVE | Noted: 2017-10-29

## 2017-10-29 PROBLEM — F33.1 MODERATE EPISODE OF RECURRENT MAJOR DEPRESSIVE DISORDER (HCC): Status: ACTIVE | Noted: 2017-10-29

## 2017-11-22 RX ORDER — ACETAMINOPHEN 500 MG
TABLET ORAL
Qty: 120 TABLET | Refills: 0 | Status: SHIPPED | OUTPATIENT
Start: 2017-11-22 | End: 2017-12-22 | Stop reason: SDUPTHER

## 2017-12-22 RX ORDER — ACETAMINOPHEN 500 MG
TABLET ORAL
Qty: 120 TABLET | Refills: 0 | Status: SHIPPED | OUTPATIENT
Start: 2017-12-22 | End: 2018-02-06 | Stop reason: SDUPTHER

## 2018-01-04 ENCOUNTER — OFFICE VISIT (OUTPATIENT)
Dept: FAMILY MEDICINE CLINIC | Age: 48
End: 2018-01-04
Payer: COMMERCIAL

## 2018-01-04 ENCOUNTER — HOSPITAL ENCOUNTER (OUTPATIENT)
Age: 48
Setting detail: SPECIMEN
Discharge: HOME | End: 2018-01-04
Payer: COMMERCIAL

## 2018-01-04 ENCOUNTER — HOSPITAL ENCOUNTER (OUTPATIENT)
Age: 48
Discharge: HOME | End: 2018-01-04
Payer: COMMERCIAL

## 2018-01-04 VITALS
WEIGHT: 201.4 LBS | OXYGEN SATURATION: 97 % | HEIGHT: 70 IN | SYSTOLIC BLOOD PRESSURE: 110 MMHG | TEMPERATURE: 97.5 F | HEART RATE: 72 BPM | DIASTOLIC BLOOD PRESSURE: 80 MMHG | BODY MASS INDEX: 28.83 KG/M2 | RESPIRATION RATE: 20 BRPM

## 2018-01-04 DIAGNOSIS — Z13.31 POSITIVE DEPRESSION SCREENING: ICD-10-CM

## 2018-01-04 DIAGNOSIS — G44.221 CHRONIC TENSION-TYPE HEADACHE, INTRACTABLE: Primary | ICD-10-CM

## 2018-01-04 DIAGNOSIS — F51.04 PSYCHOPHYSIOLOGICAL INSOMNIA: ICD-10-CM

## 2018-01-04 DIAGNOSIS — R30.9 PAIN WITH URINATION: ICD-10-CM

## 2018-01-04 DIAGNOSIS — F33.41 RECURRENT MAJOR DEPRESSIVE DISORDER, IN PARTIAL REMISSION (HCC): ICD-10-CM

## 2018-01-04 DIAGNOSIS — J45.40 MODERATE PERSISTENT ASTHMA WITHOUT COMPLICATION: ICD-10-CM

## 2018-01-04 DIAGNOSIS — I10 ESSENTIAL HYPERTENSION: ICD-10-CM

## 2018-01-04 LAB
-: NORMAL
ALBUMIN SERPL-MCNC: 4.2 G/DL (ref 3.5–5.2)
ALBUMIN/GLOBULIN RATIO: ABNORMAL (ref 1–2.5)
ALP BLD-CCNC: 91 U/L (ref 35–104)
ALT SERPL-CCNC: 17 U/L (ref 5–33)
AMORPHOUS: NORMAL
ANION GAP SERPL CALCULATED.3IONS-SCNC: 11 MMOL/L (ref 9–17)
AST SERPL-CCNC: 19 U/L
BACTERIA: NORMAL
BILIRUB SERPL-MCNC: 0.23 MG/DL (ref 0.3–1.2)
BILIRUBIN URINE: NEGATIVE
BILIRUBIN, POC: ABNORMAL
BLOOD URINE, POC: ABNORMAL
BUN BLDV-MCNC: 12 MG/DL (ref 6–20)
BUN/CREAT BLD: ABNORMAL (ref 9–20)
CALCIUM SERPL-MCNC: 9.4 MG/DL (ref 8.6–10.4)
CASTS UA: NORMAL /LPF (ref 0–8)
CHLORIDE BLD-SCNC: 100 MMOL/L (ref 98–107)
CLARITY, POC: ABNORMAL
CO2: 27 MMOL/L (ref 20–31)
COLOR, POC: YELLOW
COLOR: YELLOW
COMMENT UA: ABNORMAL
CREAT SERPL-MCNC: 0.71 MG/DL (ref 0.5–0.9)
CRYSTALS, UA: NORMAL /HPF
EPITHELIAL CELLS UA: NORMAL /HPF (ref 0–5)
GFR AFRICAN AMERICAN: >60 ML/MIN
GFR NON-AFRICAN AMERICAN: >60 ML/MIN
GFR SERPL CREATININE-BSD FRML MDRD: ABNORMAL ML/MIN/{1.73_M2}
GFR SERPL CREATININE-BSD FRML MDRD: ABNORMAL ML/MIN/{1.73_M2}
GLUCOSE BLD-MCNC: 88 MG/DL (ref 70–99)
GLUCOSE URINE, POC: ABNORMAL
GLUCOSE URINE: NEGATIVE
HCT VFR BLD CALC: 36.4 % (ref 36–46)
HEMOGLOBIN: 11.9 G/DL (ref 12–16)
HIV AG/AB: NONREACTIVE
KETONES, POC: ABNORMAL
KETONES, URINE: NEGATIVE
LEUKOCYTE EST, POC: ABNORMAL
LEUKOCYTE ESTERASE, URINE: NEGATIVE
MCH RBC QN AUTO: 29.1 PG (ref 26–34)
MCHC RBC AUTO-ENTMCNC: 32.8 G/DL (ref 31–37)
MCV RBC AUTO: 88.9 FL (ref 80–100)
MUCUS: NORMAL
NITRITE, POC: ABNORMAL
NITRITE, URINE: NEGATIVE
OTHER OBSERVATIONS UA: NORMAL
PDW BLD-RTO: 14.2 % (ref 11.5–14.9)
PH UA: 5.5 (ref 5–8)
PH, POC: 1.01
PLATELET # BLD: 280 K/UL (ref 150–450)
PMV BLD AUTO: 9 FL (ref 6–12)
POTASSIUM SERPL-SCNC: 4.1 MMOL/L (ref 3.7–5.3)
PROTEIN UA: NEGATIVE
PROTEIN, POC: ABNORMAL
RBC # BLD: 4.09 M/UL (ref 4–5.2)
RBC UA: NORMAL /HPF (ref 0–4)
RENAL EPITHELIAL, UA: NORMAL /HPF
SODIUM BLD-SCNC: 138 MMOL/L (ref 135–144)
SPECIFIC GRAVITY UA: 1.03 (ref 1–1.03)
SPECIFIC GRAVITY, POC: 1.01
TOTAL PROTEIN: 8 G/DL (ref 6.4–8.3)
TRICHOMONAS: NORMAL
TURBIDITY: CLEAR
URINE HGB: ABNORMAL
UROBILINOGEN, POC: ABNORMAL
UROBILINOGEN, URINE: NORMAL
WBC # BLD: 6.7 K/UL (ref 3.5–11)
WBC UA: NORMAL /HPF (ref 0–5)
YEAST: NORMAL

## 2018-01-04 PROCEDURE — 80053 COMPREHEN METABOLIC PANEL: CPT

## 2018-01-04 PROCEDURE — 99214 OFFICE O/P EST MOD 30 MIN: CPT | Performed by: FAMILY MEDICINE

## 2018-01-04 PROCEDURE — G8431 POS CLIN DEPRES SCRN F/U DOC: HCPCS | Performed by: FAMILY MEDICINE

## 2018-01-04 PROCEDURE — 85027 COMPLETE CBC AUTOMATED: CPT

## 2018-01-04 PROCEDURE — 36415 COLL VENOUS BLD VENIPUNCTURE: CPT

## 2018-01-04 PROCEDURE — G8484 FLU IMMUNIZE NO ADMIN: HCPCS | Performed by: FAMILY MEDICINE

## 2018-01-04 PROCEDURE — 1036F TOBACCO NON-USER: CPT | Performed by: FAMILY MEDICINE

## 2018-01-04 PROCEDURE — G8417 CALC BMI ABV UP PARAM F/U: HCPCS | Performed by: FAMILY MEDICINE

## 2018-01-04 PROCEDURE — 96160 PT-FOCUSED HLTH RISK ASSMT: CPT | Performed by: FAMILY MEDICINE

## 2018-01-04 PROCEDURE — 87389 HIV-1 AG W/HIV-1&-2 AB AG IA: CPT

## 2018-01-04 PROCEDURE — G8427 DOCREV CUR MEDS BY ELIG CLIN: HCPCS | Performed by: FAMILY MEDICINE

## 2018-01-04 RX ORDER — AMITRIPTYLINE HYDROCHLORIDE 25 MG/1
25 TABLET, FILM COATED ORAL NIGHTLY
Qty: 30 TABLET | Refills: 3 | Status: SHIPPED | OUTPATIENT
Start: 2018-01-04 | End: 2018-06-29 | Stop reason: SDUPTHER

## 2018-01-04 RX ORDER — ALBUTEROL SULFATE 2.5 MG/3ML
2.5 SOLUTION RESPIRATORY (INHALATION) EVERY 6 HOURS PRN
Qty: 120 EACH | Refills: 3 | Status: SHIPPED | OUTPATIENT
Start: 2018-01-04

## 2018-01-04 RX ORDER — ALBUTEROL SULFATE 90 UG/1
2 AEROSOL, METERED RESPIRATORY (INHALATION) EVERY 6 HOURS PRN
Qty: 1 INHALER | Refills: 3 | Status: SHIPPED | OUTPATIENT
Start: 2018-01-04 | End: 2018-06-29 | Stop reason: SDUPTHER

## 2018-01-04 ASSESSMENT — ENCOUNTER SYMPTOMS
SINUS PRESSURE: 0
NAUSEA: 0
DIARRHEA: 0
CONSTIPATION: 0
SHORTNESS OF BREATH: 0
COUGH: 0
PHOTOPHOBIA: 0
BACK PAIN: 0
STRIDOR: 0
SINUS PAIN: 0
BLOOD IN STOOL: 0
SORE THROAT: 0
EYE REDNESS: 0
RHINORRHEA: 0
WHEEZING: 0

## 2018-01-04 ASSESSMENT — PATIENT HEALTH QUESTIONNAIRE - PHQ9
10. IF YOU CHECKED OFF ANY PROBLEMS, HOW DIFFICULT HAVE THESE PROBLEMS MADE IT FOR YOU TO DO YOUR WORK, TAKE CARE OF THINGS AT HOME, OR GET ALONG WITH OTHER PEOPLE: 3
2. FEELING DOWN, DEPRESSED OR HOPELESS: 3
7. TROUBLE CONCENTRATING ON THINGS, SUCH AS READING THE NEWSPAPER OR WATCHING TELEVISION: 3
3. TROUBLE FALLING OR STAYING ASLEEP: 3
6. FEELING BAD ABOUT YOURSELF - OR THAT YOU ARE A FAILURE OR HAVE LET YOURSELF OR YOUR FAMILY DOWN: 3
5. POOR APPETITE OR OVEREATING: 3
9. THOUGHTS THAT YOU WOULD BE BETTER OFF DEAD, OR OF HURTING YOURSELF: 1
SUM OF ALL RESPONSES TO PHQ9 QUESTIONS 1 & 2: 3
4. FEELING TIRED OR HAVING LITTLE ENERGY: 3
8. MOVING OR SPEAKING SO SLOWLY THAT OTHER PEOPLE COULD HAVE NOTICED. OR THE OPPOSITE, BEING SO FIGETY OR RESTLESS THAT YOU HAVE BEEN MOVING AROUND A LOT MORE THAN USUAL: 3
SUM OF ALL RESPONSES TO PHQ QUESTIONS 1-9: 22

## 2018-01-04 NOTE — PROGRESS NOTES
PLEASE NOTIFY PATIENT. Mild anemia, worsening. Patient was referred to GI, needs to make appointment, referral is in the system. Does she have heavy bleeding?  Otherwise labs within normal limits

## 2018-01-04 NOTE — PROGRESS NOTES
been treated. /80   Pulse 72   Temp 97.5 °F (36.4 °C) (Oral)   Resp 20   Ht 5' 9.5\" (1.765 m)   Wt 201 lb 6.4 oz (91.4 kg)   LMP  (LMP Unknown)   SpO2 97%   BMI 29.32 kg/m²   Body mass index is 29.32 kg/m². Wt Readings from Last 3 Encounters:   01/04/18 201 lb 6.4 oz (91.4 kg)   10/23/17 193 lb 3.2 oz (87.6 kg)   07/21/17 200 lb 9.6 oz (91 kg)        []Negative depression screening. PHQ Scores 1/4/2018 2/22/2017   PHQ2 Score 3 4   PHQ9 Score 22 12      []1-4 = Minimal depression   []5-9 = Mild depression   [x]10-14 = Moderate depression   []15-19 = Moderately severe depression   []20-27 = Severe depression    Discussed testing with the patient and all questions fully answered.     Hospital Outpatient Visit on 05/09/2017   Component Date Value Ref Range Status    WBC 05/09/2017 6.2  3.5 - 11.0 k/uL Final    RBC 05/09/2017 4.34  4.0 - 5.2 m/uL Final    Hemoglobin 05/09/2017 12.1  12.0 - 16.0 g/dL Final    Hematocrit 05/09/2017 37.6  36 - 46 % Final    MCV 05/09/2017 86.8  80 - 100 fL Final    MCH 05/09/2017 28.0  26 - 34 pg Final    MCHC 05/09/2017 32.3  31 - 37 g/dL Final    RDW 05/09/2017 14.4  12.5 - 15.4 % Final    Platelets 02/30/6931 252  140 - 450 k/uL Final    MPV 05/09/2017 8.5  6.0 - 12.0 fL Final    Glucose 05/09/2017 92  70 - 99 mg/dL Final    BUN 05/09/2017 9  6 - 20 mg/dL Final    CREATININE 05/09/2017 0.67  0.50 - 0.90 mg/dL Final    Bun/Cre Ratio 05/09/2017 NOT REPORTED  9 - 20 Final    Calcium 05/09/2017 9.1  8.6 - 10.4 mg/dL Final    Sodium 05/09/2017 140  135 - 144 mmol/L Final    Potassium 05/09/2017 4.4  3.7 - 5.3 mmol/L Final    Chloride 05/09/2017 104  98 - 107 mmol/L Final    CO2 05/09/2017 22  20 - 31 mmol/L Final    Anion Gap 05/09/2017 14  9 - 17 mmol/L Final    Alkaline Phosphatase 05/09/2017 89  35 - 104 U/L Final    ALT 05/09/2017 24  5 - 33 U/L Final    AST 05/09/2017 26  <32 U/L Final    Total Bilirubin 05/09/2017 0.31  0.3 - 1.2 mg/dL Final    Total Protein 05/09/2017 8.1  6.4 - 8.3 g/dL Final    Alb 05/09/2017 4.2  3.5 - 5.2 g/dL Final    Albumin/Globulin Ratio 05/09/2017 1.1  1.0 - 2.5 Final    GFR Non- 05/09/2017 >60  >60 mL/min Final    GFR  05/09/2017 >60  >60 mL/min Final    GFR Comment 05/09/2017        Final    Comment: Average GFR for 38-51 years old:   80 mL/min/1.73sq m  Chronic Kidney Disease:   <60 mL/min/1.73sq m  Kidney failure:   <15 mL/min/1.73sq m              eGFR calculated using average adult body mass. Additional eGFR calculator   available at:        [email or web address]        [facility] [address] [place] [address], [address] [place] [address] [phone]      GFR Staging 05/09/2017 NOT REPORTED   Final    Cholesterol 05/09/2017 172  <200 mg/dL Final    Comment:    Cholesterol Guidelines:      <200  Desirable   200-240  Borderline      >240  Undesirable         HDL 05/09/2017 61  >40 mg/dL Final    Comment:    HDL Guidelines:    <40     Undesirable   40-59    Borderline    >59     Desirable         LDL Cholesterol 05/09/2017 87  0 - 130 mg/dL Final    Comment:    LDL Guidelines:     <100    Desirable   100-129   Near to/above Desirable   130-159   Borderline      >159   Undesirable     Direct (measured) LDL and calculated LDL are not interchangeable tests.  Chol/HDL Ratio 05/09/2017 2.8  <5 Final    Triglycerides 05/09/2017 119  <150 mg/dL Final    Comment:    Triglyceride Guidelines:     <150   Desirable   150-199  Borderline   200-499  High     >499   Very high   Based on AHA Guidelines for fasting triglyceride, October 2012.      [facility] [address] [facility], [address] [place] [address] [phone]      VLDL 05/09/2017 NOT REPORTED  1 - 30 mg/dL Final    TSH 05/09/2017 1.96  0.30 - 5.00 mIU/L Final    Iron 05/09/2017 83  37 - 145 ug/dL Final    TIBC 05/09/2017 287  250 - 450 ug/dL Final    Iron Saturation 05/09/2017 29  20 - 55 % Final    UIBC 05/09/2017 204  112 - 347 ug/dL Final         Most recent labs reviewed:     Lab Results   Component Value Date    WBC 6.2 05/09/2017    HGB 12.1 05/09/2017    HCT 37.6 05/09/2017    MCV 86.8 05/09/2017     05/09/2017       Lab Results   Component Value Date     05/09/2017    K 4.4 05/09/2017     05/09/2017    CO2 22 05/09/2017    BUN 9 05/09/2017    CREATININE 0.67 05/09/2017    GLUCOSE 92 05/09/2017    CALCIUM 9.1 05/09/2017        Lab Results   Component Value Date    ALT 24 05/09/2017    AST 26 05/09/2017    ALKPHOS 89 05/09/2017    BILITOT 0.31 05/09/2017       Lab Results   Component Value Date    TSH 1.96 05/09/2017       Lab Results   Component Value Date    CHOL 172 05/09/2017    CHOL 148 03/22/2017     Lab Results   Component Value Date    TRIG 119 05/09/2017    TRIG 76 03/22/2017     Lab Results   Component Value Date    HDL 61 05/09/2017    HDL 48 03/22/2017     Lab Results   Component Value Date    LDLCHOLESTEROL 87 05/09/2017    LDLCHOLESTEROL 85 03/22/2017     Lab Results   Component Value Date    VLDL NOT REPORTED 05/09/2017    VLDL NOT REPORTED 03/22/2017     Lab Results   Component Value Date    CHOLHDLRATIO 2.8 05/09/2017    CHOLHDLRATIO 3.1 03/22/2017       No results found for: LABA1C    Lab Results   Component Value Date    XWUYCSHW32 596 03/27/2017       Lab Results   Component Value Date    FOLATE 15.9 03/27/2017       Lab Results   Component Value Date    IRON 83 05/09/2017    TIBC 287 05/09/2017    FERRITIN 131 03/27/2017       No results found for: VITD25          Current Outpatient Prescriptions   Medication Sig Dispense Refill    albuterol sulfate HFA (PROAIR HFA) 108 (90 Base) MCG/ACT inhaler Inhale 2 puffs into the lungs every 6 hours as needed for Wheezing 1 Inhaler 3    albuterol (PROVENTIL) (2.5 MG/3ML) 0.083% nebulizer solution Take 3 mLs by nebulization every 6 hours as needed for Wheezing 120 each 3    amitriptyline (ELAVIL) 25 MG tablet Take 1 tablet by drip, rhinorrhea, sinus pain, sinus pressure and sore throat. Eyes: Negative for photophobia, redness and visual disturbance. Respiratory: Negative for cough, shortness of breath, wheezing and stridor. Cardiovascular: Negative for chest pain, palpitations and leg swelling. Gastrointestinal: Negative for blood in stool, constipation, diarrhea and nausea. Endocrine: Negative for polydipsia, polyphagia and polyuria. Genitourinary: Positive for dysuria and frequency. Negative for flank pain, genital sores, hematuria, pelvic pain, urgency, vaginal discharge and vaginal pain. Musculoskeletal: Negative for arthralgias, back pain, gait problem, joint swelling and myalgias. Neurological: Positive for headaches. Negative for dizziness, seizures, weakness and numbness. Psychiatric/Behavioral: Negative for agitation, self-injury and sleep disturbance. The patient is not nervous/anxious and is not hyperactive. Physical Exam    PHYSICAL EXAM:   VITALS:   Vitals:    01/04/18 1000   BP: 110/80   Pulse: 72   Resp: 20   Temp: 97.5 °F (36.4 °C)   SpO2: 97%     GENERAL:  Patient is a well-developed, well-nourished female  in no acute distress, alert and oriented x3, appropriate and pleasant conversation. HEAD: Normocephalic, atraumatic. EYES: Pupils equal, round and reactive to light and accommodation, extraocular   movements intact. ENT: Moist mucous membranes. No erythema is noted. NECK: Supple. No masses. No lymphadenopathy. CARDIOVASCULAR: Regular rate and rhythm. PULMONARY: Lungs are clear to auscultation bilaterally. ABDOMEN: Soft, nontender, nondistended. Positive bowel sounds. MUSCULOSKELETAL: Strength 5/5 bilaterally in all extremities. No tenderness to   palpation of the ribs, long bones, or spine. NEUROLOGIC: Cranial nerves II through XII grossly intact. No focal deficits are noted. ASSESSMENT AND PLAN      1.  Chronic tension-type headache, intractable  -Likely her

## 2018-01-05 ENCOUNTER — TELEPHONE (OUTPATIENT)
Dept: FAMILY MEDICINE CLINIC | Age: 48
End: 2018-01-05

## 2018-01-05 DIAGNOSIS — N30.01 ACUTE CYSTITIS WITH HEMATURIA: Primary | ICD-10-CM

## 2018-01-05 LAB
CULTURE: NORMAL
CULTURE: NORMAL
Lab: NORMAL
SPECIMEN DESCRIPTION: NORMAL
STATUS: NORMAL

## 2018-01-05 RX ORDER — NITROFURANTOIN 25; 75 MG/1; MG/1
100 CAPSULE ORAL 2 TIMES DAILY
Qty: 10 CAPSULE | Refills: 0 | Status: SHIPPED | OUTPATIENT
Start: 2018-01-05 | End: 2018-02-06 | Stop reason: ALTCHOICE

## 2018-01-19 ENCOUNTER — HOSPITAL ENCOUNTER (OUTPATIENT)
Dept: MAMMOGRAPHY | Age: 48
Discharge: HOME | End: 2018-01-19
Payer: COMMERCIAL

## 2018-01-19 DIAGNOSIS — Z12.31 ENCOUNTER FOR SCREENING MAMMOGRAM FOR BREAST CANCER: ICD-10-CM

## 2018-01-19 PROCEDURE — 77063 BREAST TOMOSYNTHESIS BI: CPT

## 2018-01-24 ENCOUNTER — OFFICE VISIT (OUTPATIENT)
Dept: GASTROENTEROLOGY | Age: 48
End: 2018-01-24
Payer: COMMERCIAL

## 2018-01-24 VITALS
WEIGHT: 195 LBS | HEIGHT: 69 IN | DIASTOLIC BLOOD PRESSURE: 103 MMHG | OXYGEN SATURATION: 100 % | HEART RATE: 84 BPM | SYSTOLIC BLOOD PRESSURE: 160 MMHG | BODY MASS INDEX: 28.88 KG/M2

## 2018-01-24 DIAGNOSIS — D64.9 ANEMIA, UNSPECIFIED TYPE: ICD-10-CM

## 2018-01-24 DIAGNOSIS — R19.8 ALTERNATING CONSTIPATION AND DIARRHEA: Primary | ICD-10-CM

## 2018-01-24 DIAGNOSIS — K21.9 GASTROESOPHAGEAL REFLUX DISEASE, ESOPHAGITIS PRESENCE NOT SPECIFIED: ICD-10-CM

## 2018-01-24 PROCEDURE — G8427 DOCREV CUR MEDS BY ELIG CLIN: HCPCS | Performed by: INTERNAL MEDICINE

## 2018-01-24 PROCEDURE — G8417 CALC BMI ABV UP PARAM F/U: HCPCS | Performed by: INTERNAL MEDICINE

## 2018-01-24 PROCEDURE — 1036F TOBACCO NON-USER: CPT | Performed by: INTERNAL MEDICINE

## 2018-01-24 PROCEDURE — G8484 FLU IMMUNIZE NO ADMIN: HCPCS | Performed by: INTERNAL MEDICINE

## 2018-01-24 PROCEDURE — 99204 OFFICE O/P NEW MOD 45 MIN: CPT | Performed by: INTERNAL MEDICINE

## 2018-01-24 RX ORDER — OMEPRAZOLE 20 MG/1
20 TABLET, DELAYED RELEASE ORAL DAILY
Qty: 30 TABLET | Refills: 3 | Status: SHIPPED | OUTPATIENT
Start: 2018-01-24 | End: 2018-02-06

## 2018-01-24 ASSESSMENT — ENCOUNTER SYMPTOMS
DIARRHEA: 1
ABDOMINAL DISTENTION: 1
CONSTIPATION: 1
BLOOD IN STOOL: 0
SHORTNESS OF BREATH: 0
RECTAL PAIN: 0
NAUSEA: 0
BACK PAIN: 0
EYES NEGATIVE: 1
ABDOMINAL PAIN: 1
CHOKING: 0
COUGH: 0
ANAL BLEEDING: 0
VOMITING: 0
TROUBLE SWALLOWING: 0
SORE THROAT: 0

## 2018-01-24 NOTE — PROGRESS NOTES
INITIAL NOTE    HISTORY OF PRESENT ILLNESS: Ms. Cassie Goodman is a 52 y.o. female referred for evaluation of GERD and altered bowel habits. She reports frequent heartburns. Almost daily. She has been having this for at least couple years. No nausea or vomiting. No blood in the stool or melena. She had mild diarrhea for around 2 months. 2 bowel movements per day. Loose. She was found to have UTI. She was given antibiotics for that. This helped significantly with diarrhea. Currently she is having normal bowel movements. No prior EGD or colonoscopy. Past Medical, Family, and Social History reviewed and does contribute to the patient presenting condition. Patient's PMH/PSH,SH,PSYCH Hx, MEDs, ALLERGIES, and ROS were all reviewed and updated in the appropriate sections.     PAST MEDICAL HISTORY:  Past Medical History:   Diagnosis Date    Abdominal pain     Acute pain of left shoulder 7/21/2017    Asthma 1981    since 12yo    Bipolar disorder ([facility] 2016    established w/ [facility], was started on Seroquel    Candidal vaginitis 3/27/2017    Chronic tension-type headache, intractable 1/4/2018    Constipation     Diarrhea     DVT (deep vein thrombosis) in pregnancy ([facility] [address]     x 2, has IVC filter, was on Coumadin before    Elevated antinuclear antibody (TIMMY) level 1/12/2016    history of      Essential hypertension 3/27/2017    GERD (gastroesophageal reflux disease)     Hyperlipidemia     Hypertension     Iron deficiency anemia 2/15/2016    Iron deficiency anemia due to chronic blood loss 3/22/2017    Iron deficiency anemia due to chronic blood loss 3/22/2017    Gastritis    Lupus     possible per verbal report, had elevated TIMMY,  she failed to f/u     Moderate episode of recurrent major depressive disorder ([facility] [address] 10/29/2017    Obesity (BMI 30.0-34.9) 7/21/2017    Positive TIMMY (antinuclear antibody) 2001    PUD (peptic ulcer disease)     h/o    Rapid heart rate        Past Surgical History: name: N/A    Number of children: N/A    Years of education: N/A     Occupational History    Not on file. Social History Main Topics    Smoking status: Former Smoker     Years: 18.00     Types: Cigarettes     Start date: 1/1/1996     Quit date: 10/9/2017    Smokeless tobacco: Never Used      Comment: 1/2-1 PPD, quit smoking 2 weeks ago per my note from 10/23/17    Alcohol use No      Comment: quit smoking with patches    Drug use: Yes     Types: Marijuana    Sexual activity: Yes     Partners: Male     Other Topics Concern    Not on file     Social History Narrative    No narrative on file       REVIEW OF SYSTEMS: A 12-point review of systems was obtained and pertinent positives and negatives were enumerated above in the history of present illness. All other reviewed systems / symptoms were negative. Review of Systems   Constitutional: Positive for chills, diaphoresis and fatigue. Negative for fever and unexpected weight change. HENT: Negative for congestion, sore throat and trouble swallowing. Eyes: Negative. Respiratory: Negative for cough, choking and shortness of breath. Cardiovascular: Negative for chest pain. Gastrointestinal: Positive for abdominal distention, abdominal pain (low abd pain), constipation and diarrhea. Negative for anal bleeding, blood in stool, nausea, rectal pain and vomiting. Endocrine: Negative. Genitourinary: Positive for difficulty urinating. Musculoskeletal: Negative for arthralgias and back pain. Skin: Negative. Allergic/Immunologic: Positive for environmental allergies. Negative for food allergies. Neurological: Negative for dizziness, light-headedness and numbness. Hematological: Does not bruise/bleed easily. Psychiatric/Behavioral: Positive for sleep disturbance. PHYSICAL EXAMINATION: Vital signs reviewed per the nursing documentation.      BP (!) 160/103   Pulse 84   Ht 5' 9\" (1.753 m)   Wt 195 lb (88.5 kg)   LMP  (LMP Unknown)

## 2018-02-06 ENCOUNTER — OFFICE VISIT (OUTPATIENT)
Dept: FAMILY MEDICINE CLINIC | Age: 48
End: 2018-02-06
Payer: COMMERCIAL

## 2018-02-06 VITALS
TEMPERATURE: 97.6 F | DIASTOLIC BLOOD PRESSURE: 90 MMHG | BODY MASS INDEX: 30.16 KG/M2 | HEIGHT: 69 IN | OXYGEN SATURATION: 97 % | SYSTOLIC BLOOD PRESSURE: 134 MMHG | HEART RATE: 88 BPM | WEIGHT: 203.6 LBS

## 2018-02-06 DIAGNOSIS — Z23 NEED FOR DIPHTHERIA-TETANUS-PERTUSSIS (TDAP) VACCINE: ICD-10-CM

## 2018-02-06 DIAGNOSIS — I10 ESSENTIAL HYPERTENSION: Primary | ICD-10-CM

## 2018-02-06 DIAGNOSIS — D64.9 ANEMIA, UNSPECIFIED TYPE: ICD-10-CM

## 2018-02-06 DIAGNOSIS — F33.1 MODERATE EPISODE OF RECURRENT MAJOR DEPRESSIVE DISORDER (HCC): ICD-10-CM

## 2018-02-06 DIAGNOSIS — J45.40 MODERATE PERSISTENT ASTHMA WITHOUT COMPLICATION: ICD-10-CM

## 2018-02-06 PROBLEM — R19.8 ALTERNATING CONSTIPATION AND DIARRHEA: Status: RESOLVED | Noted: 2017-03-26 | Resolved: 2018-02-06

## 2018-02-06 PROCEDURE — G8427 DOCREV CUR MEDS BY ELIG CLIN: HCPCS | Performed by: FAMILY MEDICINE

## 2018-02-06 PROCEDURE — G8484 FLU IMMUNIZE NO ADMIN: HCPCS | Performed by: FAMILY MEDICINE

## 2018-02-06 PROCEDURE — 99214 OFFICE O/P EST MOD 30 MIN: CPT | Performed by: FAMILY MEDICINE

## 2018-02-06 PROCEDURE — G8417 CALC BMI ABV UP PARAM F/U: HCPCS | Performed by: FAMILY MEDICINE

## 2018-02-06 PROCEDURE — 96127 BRIEF EMOTIONAL/BEHAV ASSMT: CPT | Performed by: FAMILY MEDICINE

## 2018-02-06 PROCEDURE — 1036F TOBACCO NON-USER: CPT | Performed by: FAMILY MEDICINE

## 2018-02-06 RX ORDER — FLUTICASONE PROPIONATE 220 UG/1
2 AEROSOL, METERED RESPIRATORY (INHALATION) 2 TIMES DAILY
Qty: 1 INHALER | Refills: 11 | Status: SHIPPED | OUTPATIENT
Start: 2018-02-06 | End: 2019-03-07 | Stop reason: SDUPTHER

## 2018-02-06 RX ORDER — BLOOD PRESSURE TEST KIT
KIT MISCELLANEOUS
Qty: 1 KIT | Refills: 0 | Status: SHIPPED | OUTPATIENT
Start: 2018-02-06

## 2018-02-06 RX ORDER — ACETAMINOPHEN 500 MG
TABLET ORAL
Qty: 120 TABLET | Refills: 3 | Status: SHIPPED | OUTPATIENT
Start: 2018-02-06 | End: 2018-06-29 | Stop reason: SDUPTHER

## 2018-02-06 RX ORDER — AMLODIPINE BESYLATE 5 MG/1
5 TABLET ORAL DAILY
Qty: 90 TABLET | Refills: 3 | Status: SHIPPED | OUTPATIENT
Start: 2018-02-06 | End: 2018-11-01 | Stop reason: SDUPTHER

## 2018-02-06 ASSESSMENT — ENCOUNTER SYMPTOMS
ABDOMINAL DISTENTION: 0
DIARRHEA: 0
VOMITING: 0
CHEST TIGHTNESS: 0
NAUSEA: 0
WHEEZING: 0
ABDOMINAL PAIN: 0
CONSTIPATION: 0
COUGH: 0
SHORTNESS OF BREATH: 0

## 2018-02-06 ASSESSMENT — PATIENT HEALTH QUESTIONNAIRE - PHQ9
9. THOUGHTS THAT YOU WOULD BE BETTER OFF DEAD, OR OF HURTING YOURSELF: 0
7. TROUBLE CONCENTRATING ON THINGS, SUCH AS READING THE NEWSPAPER OR WATCHING TELEVISION: 3
6. FEELING BAD ABOUT YOURSELF - OR THAT YOU ARE A FAILURE OR HAVE LET YOURSELF OR YOUR FAMILY DOWN: 1
SUM OF ALL RESPONSES TO PHQ9 QUESTIONS 1 & 2: 4
2. FEELING DOWN, DEPRESSED OR HOPELESS: 3
SUM OF ALL RESPONSES TO PHQ QUESTIONS 1-9: 14
1. LITTLE INTEREST OR PLEASURE IN DOING THINGS: 1
8. MOVING OR SPEAKING SO SLOWLY THAT OTHER PEOPLE COULD HAVE NOTICED. OR THE OPPOSITE, BEING SO FIGETY OR RESTLESS THAT YOU HAVE BEEN MOVING AROUND A LOT MORE THAN USUAL: 1
10. IF YOU CHECKED OFF ANY PROBLEMS, HOW DIFFICULT HAVE THESE PROBLEMS MADE IT FOR YOU TO DO YOUR WORK, TAKE CARE OF THINGS AT HOME, OR GET ALONG WITH OTHER PEOPLE: 1
5. POOR APPETITE OR OVEREATING: 1
4. FEELING TIRED OR HAVING LITTLE ENERGY: 1
3. TROUBLE FALLING OR STAYING ASLEEP: 3

## 2018-02-06 ASSESSMENT — ASTHMA QUESTIONNAIRES
QUESTION_2 LAST FOUR WEEKS HOW OFTEN HAVE YOU HAD SHORTNESS OF BREATH: 4
QUESTION_3 LAST FOUR WEEKS HOW OFTEN DID YOUR ASTHMA SYMPTOMS (WHEEZING, COUGHING, SHORTNESS OF BREATH, CHEST TIGHTNESS OR PAIN) WAKE YOU UP AT NIGHT OR EARLIER THAN USUAL IN THE MORNING: 4
ACT_TOTALSCORE: 20
QUESTION_1 LAST FOUR WEEKS HOW MUCH OF THE TIME DID YOUR ASTHMA KEEP YOU FROM GETTING AS MUCH DONE AT WORK, SCHOOL OR AT HOME: 4
QUESTION_4 LAST FOUR WEEKS HOW OFTEN HAVE YOU USED YOUR RESCUE INHALER OR NEBULIZER MEDICATION (SUCH AS ALBUTEROL): 4
QUESTION_5 LAST FOUR WEEKS HOW WOULD YOU RATE YOUR ASTHMA CONTROL: 4

## 2018-02-06 NOTE — PROGRESS NOTES
Musculoskeletal: Normal range of motion. She exhibits no edema or tenderness. Neurological: She is alert and oriented to person, place, and time. No cranial nerve deficit. She exhibits normal muscle tone. Skin: Skin is warm and dry. No rash noted. She is not diaphoretic. Psychiatric: Her behavior is normal. Judgment and thought content normal. Her mood appears anxious. Nursing note and vitals reviewed. ASSESSMENT AND PLAN      1. 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 Needs to check blood pressure 1-2 times a day until stable, then once a day. Goal blood pressure less than 135/85, and above 110/60. Dispense: 1 kit; Refill: 0  - CBC; Future  - Basic Metabolic Panel; Future  - TSH without Reflex; Future  Discussed low salt diet and BP and pulse monitoring daily, BP log given  Continue current treatment. 2. Moderate persistent asthma without complication  Controlled  Continue current treatment. - fluticasone (FLOVENT HFA) 220 MCG/ACT inhaler; Inhale 2 puffs into the lungs 2 times daily  Dispense: 1 Inhaler; Refill: 11  Reprinted pulmonary function test and advised patient to have it done    3. Moderate episode of recurrent major depressive disorder (HCC)  moderate  improved from prior   continue follow up with [facility]  She is on Elavil at nighttime    4. Anemia, unspecified type  - CBC; Future  - Path Review, Smear; Future  Persistent anemia, she will need for workup EGD  Will repeat UA at next appointment   Refusing to have EGD at this time. If persistent anemia, then she agrees to have EGD done. We will have blood work in 3 months      5. Need for diphtheria-tetanus-pertussis (Tdap) vaccine  - Tetanus-Diphth-Acell Pertussis (BOOSTRIX) 5-2.5-18.5 LF-MCG/0.5 injection;  Inject 0.5 mLs into the kit 0     Sig: Diagnosis: HTN. Needs to check blood pressure 1-2 times a day until stable, then once a day. Goal blood pressure less than 135/85, and above 110/60. All patient questions answered. Patient voiced understanding. Quality Measures    Body mass index is 30.07 kg/m². Elevated. Weight control planned discussed conventional weight loss and Healthy diet and regular exercise. BP: (!) 134/90 Blood pressure is normal. Treatment plan consists of Weight Reduction, DASH Eating Plan, Dietary Sodium Restriction, Increased Physical Activity, Avoid Tobacco and Second-hand Smoke, Patient In-home Blood Pressure Monitoring and No treatment change needed. LDL controlled    Lab Results   Component Value Date    LDLCHOLESTEROL 87 05/09/2017    (goal LDL reduction with dx if diabetes is 50% LDL reduction)      Moderate depression, Continue current treatment and follow up with psychiatrist and psychologist as scheduled. PHQ Scores 2/6/2018 1/4/2018 2/22/2017   PHQ2 Score 4 3 4   PHQ9 Score 14 22 12     Interpretation of Total Score Depression Severity: 1-4 = Minimal depression, 5-9 = Mild depression, 10-14 = Moderate depression, 15-19 = Moderately severe depression, 20-27 = Severe depression        The patient's past medical, surgical, social, and family history as well as her   current medications and allergies were reviewed as documented in today's encounter. Medications, labs, diagnostic studies, consultations and follow-up as documented in this encounter. Return in about 3 months (around 5/6/2018) for HTN, LABS F/U, ASTHMA, O2. Patient was seen with total face to face time of  25 minutes. More than 50% of this visit was counseling and education.      Future Appointments  Date Time Provider Candie Barksdale   5/7/2018 10:00 AM [facility]        This note was completed by using the assistance of a speech-recognition program. However, inadvertent computerized

## 2018-04-20 ENCOUNTER — HOSPITAL ENCOUNTER (OUTPATIENT)
Age: 48
Discharge: HOME | End: 2018-04-20
Payer: COMMERCIAL

## 2018-04-20 ENCOUNTER — OFFICE VISIT (OUTPATIENT)
Dept: FAMILY MEDICINE CLINIC | Age: 48
End: 2018-04-20
Payer: COMMERCIAL

## 2018-04-20 VITALS
WEIGHT: 200 LBS | HEIGHT: 68 IN | OXYGEN SATURATION: 98 % | HEART RATE: 83 BPM | TEMPERATURE: 97.5 F | SYSTOLIC BLOOD PRESSURE: 121 MMHG | BODY MASS INDEX: 30.31 KG/M2 | DIASTOLIC BLOOD PRESSURE: 81 MMHG

## 2018-04-20 DIAGNOSIS — Z23 NEED FOR DIPHTHERIA-TETANUS-PERTUSSIS (TDAP) VACCINE: ICD-10-CM

## 2018-04-20 DIAGNOSIS — R22.9 SINGLE SKIN NODULE: Primary | ICD-10-CM

## 2018-04-20 DIAGNOSIS — I10 ESSENTIAL HYPERTENSION: ICD-10-CM

## 2018-04-20 DIAGNOSIS — R53.82 CHRONIC FATIGUE: ICD-10-CM

## 2018-04-20 DIAGNOSIS — D50.8 OTHER IRON DEFICIENCY ANEMIA: ICD-10-CM

## 2018-04-20 DIAGNOSIS — J45.40 MODERATE PERSISTENT ASTHMA WITHOUT COMPLICATION: ICD-10-CM

## 2018-04-20 DIAGNOSIS — R07.89 ATYPICAL CHEST PAIN: ICD-10-CM

## 2018-04-20 LAB
ANION GAP SERPL CALCULATED.3IONS-SCNC: 10 MMOL/L (ref 9–17)
BUN BLDV-MCNC: 12 MG/DL (ref 6–20)
BUN/CREAT BLD: NORMAL (ref 9–20)
CALCIUM SERPL-MCNC: 9.4 MG/DL (ref 8.6–10.4)
CHLORIDE BLD-SCNC: 102 MMOL/L (ref 98–107)
CO2: 25 MMOL/L (ref 20–31)
CREAT SERPL-MCNC: 0.67 MG/DL (ref 0.5–0.9)
GFR AFRICAN AMERICAN: >60 ML/MIN
GFR NON-AFRICAN AMERICAN: >60 ML/MIN
GFR SERPL CREATININE-BSD FRML MDRD: NORMAL ML/MIN/{1.73_M2}
GFR SERPL CREATININE-BSD FRML MDRD: NORMAL ML/MIN/{1.73_M2}
GLUCOSE BLD-MCNC: 85 MG/DL (ref 70–99)
HCT VFR BLD CALC: 38.1 % (ref 36–46)
HEMOGLOBIN: 12.6 G/DL (ref 12–16)
MCH RBC QN AUTO: 29.3 PG (ref 26–34)
MCHC RBC AUTO-ENTMCNC: 33.1 G/DL (ref 31–37)
MCV RBC AUTO: 88.4 FL (ref 80–100)
NRBC AUTOMATED: NORMAL PER 100 WBC
PDW BLD-RTO: 13.4 % (ref 11.5–14.9)
PLATELET # BLD: 272 K/UL (ref 150–450)
PMV BLD AUTO: 8.3 FL (ref 6–12)
POTASSIUM SERPL-SCNC: 3.8 MMOL/L (ref 3.7–5.3)
RBC # BLD: 4.31 M/UL (ref 4–5.2)
SODIUM BLD-SCNC: 137 MMOL/L (ref 135–144)
TSH SERPL DL<=0.05 MIU/L-ACNC: 1.17 MIU/L (ref 0.3–5)
WBC # BLD: 6.2 K/UL (ref 3.5–11)

## 2018-04-20 PROCEDURE — 85027 COMPLETE CBC AUTOMATED: CPT

## 2018-04-20 PROCEDURE — G8427 DOCREV CUR MEDS BY ELIG CLIN: HCPCS | Performed by: FAMILY MEDICINE

## 2018-04-20 PROCEDURE — 90471 IMMUNIZATION ADMIN: CPT | Performed by: FAMILY MEDICINE

## 2018-04-20 PROCEDURE — 80048 BASIC METABOLIC PNL TOTAL CA: CPT

## 2018-04-20 PROCEDURE — 84443 ASSAY THYROID STIM HORMONE: CPT

## 2018-04-20 PROCEDURE — 1036F TOBACCO NON-USER: CPT | Performed by: FAMILY MEDICINE

## 2018-04-20 PROCEDURE — 90715 TDAP VACCINE 7 YRS/> IM: CPT | Performed by: FAMILY MEDICINE

## 2018-04-20 PROCEDURE — 99214 OFFICE O/P EST MOD 30 MIN: CPT | Performed by: FAMILY MEDICINE

## 2018-04-20 PROCEDURE — G8417 CALC BMI ABV UP PARAM F/U: HCPCS | Performed by: FAMILY MEDICINE

## 2018-04-20 PROCEDURE — 36415 COLL VENOUS BLD VENIPUNCTURE: CPT

## 2018-04-20 RX ORDER — NICOTINE POLACRILEX 4 MG/1
GUM, CHEWING ORAL
COMMUNITY
Start: 2018-03-30 | End: 2018-08-01 | Stop reason: SDUPTHER

## 2018-04-20 RX ORDER — LAMOTRIGINE 100 MG/1
TABLET ORAL
COMMUNITY
Start: 2018-03-19 | End: 2019-06-14 | Stop reason: ALTCHOICE

## 2018-04-20 ASSESSMENT — ASTHMA QUESTIONNAIRES
QUESTION_5 LAST FOUR WEEKS HOW WOULD YOU RATE YOUR ASTHMA CONTROL: 5
QUESTION_2 LAST FOUR WEEKS HOW OFTEN HAVE YOU HAD SHORTNESS OF BREATH: 4
ACT_TOTALSCORE: 19
QUESTION_4 LAST FOUR WEEKS HOW OFTEN HAVE YOU USED YOUR RESCUE INHALER OR NEBULIZER MEDICATION (SUCH AS ALBUTEROL): 4
QUESTION_1 LAST FOUR WEEKS HOW MUCH OF THE TIME DID YOUR ASTHMA KEEP YOU FROM GETTING AS MUCH DONE AT WORK, SCHOOL OR AT HOME: 2
QUESTION_3 LAST FOUR WEEKS HOW OFTEN DID YOUR ASTHMA SYMPTOMS (WHEEZING, COUGHING, SHORTNESS OF BREATH, CHEST TIGHTNESS OR PAIN) WAKE YOU UP AT NIGHT OR EARLIER THAN USUAL IN THE MORNING: 4

## 2018-04-20 ASSESSMENT — ENCOUNTER SYMPTOMS
ABDOMINAL DISTENTION: 0
CHEST TIGHTNESS: 0
VOMITING: 0
COUGH: 0
DIARRHEA: 0
SHORTNESS OF BREATH: 0
NAUSEA: 0
CONSTIPATION: 0
WHEEZING: 0
ABDOMINAL PAIN: 0

## 2018-04-28 PROBLEM — R22.9 SINGLE SKIN NODULE: Status: ACTIVE | Noted: 2018-04-28

## 2018-06-29 ENCOUNTER — TELEPHONE (OUTPATIENT)
Dept: GASTROENTEROLOGY | Age: 48
End: 2018-06-29

## 2018-06-29 DIAGNOSIS — F33.41 RECURRENT MAJOR DEPRESSIVE DISORDER, IN PARTIAL REMISSION (HCC): ICD-10-CM

## 2018-06-29 DIAGNOSIS — J45.40 MODERATE PERSISTENT ASTHMA WITHOUT COMPLICATION: ICD-10-CM

## 2018-06-29 DIAGNOSIS — G44.229 CHRONIC TENSION-TYPE HEADACHE, NOT INTRACTABLE: Primary | ICD-10-CM

## 2018-06-29 RX ORDER — NICOTINE POLACRILEX 4 MG/1
20 GUM, CHEWING ORAL DAILY
Qty: 30 TABLET | Refills: 3 | Status: SHIPPED | OUTPATIENT
Start: 2018-06-29 | End: 2021-02-18

## 2018-06-29 RX ORDER — ACETAMINOPHEN 500 MG
TABLET ORAL
Qty: 120 TABLET | Refills: 0 | Status: SHIPPED | OUTPATIENT
Start: 2018-06-29 | End: 2018-11-10 | Stop reason: SDUPTHER

## 2018-06-29 RX ORDER — AMITRIPTYLINE HYDROCHLORIDE 25 MG/1
25 TABLET, FILM COATED ORAL NIGHTLY
Qty: 30 TABLET | Refills: 0 | Status: SHIPPED | OUTPATIENT
Start: 2018-06-29 | End: 2019-06-14 | Stop reason: ALTCHOICE

## 2018-07-10 ENCOUNTER — APPOINTMENT (OUTPATIENT)
Dept: GENERAL RADIOLOGY | Age: 48
End: 2018-07-10
Payer: COMMERCIAL

## 2018-07-10 ENCOUNTER — HOSPITAL ENCOUNTER (EMERGENCY)
Age: 48
Discharge: HOME | End: 2018-07-10
Attending: EMERGENCY MEDICINE
Payer: COMMERCIAL

## 2018-07-10 VITALS
SYSTOLIC BLOOD PRESSURE: 128 MMHG | RESPIRATION RATE: 16 BRPM | HEIGHT: 71 IN | OXYGEN SATURATION: 100 % | WEIGHT: 194 LBS | TEMPERATURE: 98.2 F | BODY MASS INDEX: 27.16 KG/M2 | HEART RATE: 60 BPM | DIASTOLIC BLOOD PRESSURE: 76 MMHG

## 2018-07-10 DIAGNOSIS — S92.415A CLOSED NONDISPLACED FRACTURE OF PROXIMAL PHALANX OF LEFT GREAT TOE, INITIAL ENCOUNTER: Primary | ICD-10-CM

## 2018-07-10 PROCEDURE — 99283 EMERGENCY DEPT VISIT LOW MDM: CPT

## 2018-07-10 PROCEDURE — 73630 X-RAY EXAM OF FOOT: CPT

## 2018-07-10 PROCEDURE — 73610 X-RAY EXAM OF ANKLE: CPT

## 2018-07-10 RX ORDER — ACETAMINOPHEN AND CODEINE PHOSPHATE 300; 30 MG/1; MG/1
1 TABLET ORAL EVERY 6 HOURS PRN
Qty: 12 TABLET | Refills: 0 | Status: SHIPPED | OUTPATIENT
Start: 2018-07-10 | End: 2018-07-17

## 2018-07-10 RX ORDER — IBUPROFEN 800 MG/1
800 TABLET ORAL EVERY 8 HOURS PRN
Qty: 15 TABLET | Refills: 0 | Status: SHIPPED | OUTPATIENT
Start: 2018-07-10 | End: 2021-02-17 | Stop reason: SINTOL

## 2018-07-10 ASSESSMENT — ENCOUNTER SYMPTOMS
COLOR CHANGE: 1
SHORTNESS OF BREATH: 0
COUGH: 0

## 2018-07-10 ASSESSMENT — PAIN SCALES - GENERAL: PAINLEVEL_OUTOF10: 9

## 2018-07-10 NOTE — ED PROVIDER NOTES
hysterectomy; per exam on 16-TOTAL HYST!  VENA CAVA FILTER PLACEMENT           FAMILY HISTORY       Family History   Problem Relation Age of Onset    Anemia Sister     Cancer Maternal Grandmother         breast cancer    Cancer Maternal Grandfather         liver cancer    Cancer Maternal Aunt         stomach cancer     Family Status   Relation Status    Sister Alive    MGM     MGF     Mother     Father     Brother Alive    PGM     PGF     MAunt (Not Specified)        SOCIAL HISTORY      reports that she quit smoking about 9 months ago. Her smoking use included Cigarettes. She started smoking about 22 years ago. She quit after 18.00 years of use. She has never used smokeless tobacco. She reports that she uses drugs, including Marijuana. She reports that she does not drink alcohol. REVIEW OF SYSTEMS    (2-9 systems for level 4, 10 or more for level 5)     Review of Systems   Constitutional: Negative for chills, diaphoresis, fatigue and fever. Respiratory: Negative for cough and shortness of breath. Cardiovascular: Negative for chest pain. Musculoskeletal: Positive for arthralgias and myalgias. Skin: Positive for color change. Negative for rash and wound. Neurological: Negative for weakness and numbness. Except as noted above the remainder of the review of systems was reviewed and negative. PHYSICAL EXAM    (up to 7 for level 4, 8 or more for level 5)     ED Triage Vitals [07/10/18 1219]   BP Temp Temp Source Pulse Resp SpO2 Height Weight   128/76 98.2 °F (36.8 °C) Oral 60 16 100 % 5' 11\" (1.803 m) 194 lb (88 kg)     Physical Exam   Constitutional: She is oriented to person, place, and time. She appears well-developed and well-nourished. No distress. Eyes: Conjunctivae are normal.   Cardiovascular: Normal rate, regular rhythm and normal heart sounds.     Pulmonary/Chest: Effort normal and breath sounds normal. No respiratory toe x 2 days due to fall Acuity: Acute Type of Exam: Initial FINDINGS: Left ankle:  No acute fracture. Mild soft tissue swelling. No widening of the ankle mortise. Left foot:  Nondisplaced fracture through the 1st proximal phalanx. Narrowing of the interphalangeal joint spaces and 1st metatarsophalangeal joint space. Nondisplaced fracture of the 1st proximal phalanx. Mild ankle soft tissue swelling. EMERGENCY DEPARTMENT COURSE and DIFFERENTIAL DIAGNOSIS/MDM:   Vitals:    Vitals:    07/10/18 1219   BP: 128/76   Pulse: 60   Resp: 16   Temp: 98.2 °F (36.8 °C)   TempSrc: Oral   SpO2: 100%   Weight: 194 lb (88 kg)   Height: 5' 11\" (1.803 m)       CLINICAL DECISION MAKING:  The patient presented alert with a nontoxic appearance and was seen in conjunction with Dr. Inder Palma. Imaging showed a nondisplaced fracture of the 1st proximal phalanx. The great toe was tomasz taped to the second toe with padding between. A post-op shoe was applied. The applications were checked by me and were appropriate. Prescriptions were written got motrin and tylenol #3. The patient was advised to not drink alcohol, drive, or operate heavy machinery while taking the tylenol #3. Follow up with a podiatrist, return to ED if condition worsens. OARRS was reviewed. FINAL IMPRESSION      1. Closed nondisplaced fracture of proximal phalanx of left great toe, initial encounter            Problem List  Patient Active Problem List   Diagnosis Code    Chronic fatigue R53.82    Moderate persistent asthma without complication A96.35    Murmur, cardiac R01.1    History of DVT (deep vein thrombosis) x 2 Z86.718    Presence of IVC filter Z95.828    History of anaphylaxis Z87.892    Positive depression screening Z13.89    Unintended weight gain R63.5    Essential hypertension I10    Obesity (BMI 30.0-34. 9) E66.9    Ex-smoker Z87.891    Moderate episode of recurrent major depressive disorder ([facility] [address] F33.1    Recurrent major depressive disorder, in partial remission (HCC) F33.41    Chronic tension-type headache, intractable G44.221    Psychophysiological insomnia F51.04    Anemia D64.9    Atypical chest pain R07.89    Single skin nodule LEFT MEDIAL THIGH  R22.9         DISPOSITION/PLAN   DISPOSITION Decision To Discharge 07/10/2018 01:18:27 PM      PATIENT REFERRED TO:   Marimar Thorne, [address]  [address]  [place] [address]  [phone]    Schedule an appointment as soon as possible for a visit       Rusty Humphrey MD  [address]  [address]  [address] [facility] [address]  [address] [facility] ED  [address] [facility]  [phone]    As needed, If symptoms worsen      DISCHARGE MEDICATIONS:     Discharge Medication List as of 7/10/2018  1:21 PM      START taking these medications    Details   acetaminophen-codeine (TYLENOL/CODEINE #3) 300-30 MG per tablet Take 1 tablet by mouth every 6 hours as needed for Pain (WARNING:  May cause drowsiness.  May impair ability to operate vehicles or machinery.  Do not use in combination with alcohol.) for up to 7 days. ., Disp-12 tablet, R-0Print      ibuprofen (ADVIL;MOTRIN) 800 MG tablet Take 1 tablet by mouth every 8 hours as needed for Pain or Fever, Disp-15 tablet, R-0Print                 (Please note that portions of this note were completed with a voice recognition program.  Efforts were made to edit the dictations but occasionally words are mis-transcribed.)    PHIL Fan CNP, APRN - CNP  07/10/18 9240

## 2018-07-10 NOTE — ED PROVIDER NOTES
The patient was seen and examined by me in conjunction with the mid-level provider. I agree with his/her assessment and treatment plan. X-ray of her foot on my interpretation shows a fracture of the proximal phalanx of the hallux.      Sarita Meza MD  07/10/18 1645

## 2018-08-01 ENCOUNTER — OFFICE VISIT (OUTPATIENT)
Dept: FAMILY MEDICINE CLINIC | Age: 48
End: 2018-08-01
Payer: COMMERCIAL

## 2018-08-01 VITALS
HEIGHT: 70 IN | WEIGHT: 192.2 LBS | DIASTOLIC BLOOD PRESSURE: 77 MMHG | HEART RATE: 58 BPM | OXYGEN SATURATION: 99 % | SYSTOLIC BLOOD PRESSURE: 113 MMHG | TEMPERATURE: 98.1 F | BODY MASS INDEX: 27.52 KG/M2

## 2018-08-01 DIAGNOSIS — B35.3 TINEA PEDIS OF BOTH FEET: ICD-10-CM

## 2018-08-01 DIAGNOSIS — I10 ESSENTIAL HYPERTENSION: Primary | ICD-10-CM

## 2018-08-01 DIAGNOSIS — S92.415A NONDISPLACED FRACTURE OF PROXIMAL PHALANX OF LEFT GREAT TOE, INITIAL ENCOUNTER FOR CLOSED FRACTURE: ICD-10-CM

## 2018-08-01 DIAGNOSIS — B35.1 ONYCHOMYCOSIS OF TOENAIL: ICD-10-CM

## 2018-08-01 DIAGNOSIS — K58.0 IRRITABLE BOWEL SYNDROME WITH DIARRHEA: ICD-10-CM

## 2018-08-01 DIAGNOSIS — F31.9 BIPOLAR DEPRESSION (HCC): ICD-10-CM

## 2018-08-01 PROBLEM — F33.1 MODERATE EPISODE OF RECURRENT MAJOR DEPRESSIVE DISORDER (HCC): Status: RESOLVED | Noted: 2017-10-29 | Resolved: 2018-08-01

## 2018-08-01 PROCEDURE — 99214 OFFICE O/P EST MOD 30 MIN: CPT | Performed by: FAMILY MEDICINE

## 2018-08-01 PROCEDURE — 1036F TOBACCO NON-USER: CPT | Performed by: FAMILY MEDICINE

## 2018-08-01 PROCEDURE — G8417 CALC BMI ABV UP PARAM F/U: HCPCS | Performed by: FAMILY MEDICINE

## 2018-08-01 PROCEDURE — G8427 DOCREV CUR MEDS BY ELIG CLIN: HCPCS | Performed by: FAMILY MEDICINE

## 2018-08-01 PROCEDURE — 96160 PT-FOCUSED HLTH RISK ASSMT: CPT | Performed by: FAMILY MEDICINE

## 2018-08-01 RX ORDER — GREEN TEA/HOODIA GORDONII 315-12.5MG
1 CAPSULE ORAL DAILY
Qty: 90 TABLET | Refills: 3 | Status: SHIPPED | OUTPATIENT
Start: 2018-08-01 | End: 2021-02-17 | Stop reason: SINTOL

## 2018-08-01 RX ORDER — TERBINAFINE HYDROCHLORIDE 250 MG/1
250 TABLET ORAL DAILY
Qty: 84 TABLET | Refills: 0 | Status: SHIPPED | OUTPATIENT
Start: 2018-08-01 | End: 2018-10-24

## 2018-08-01 RX ORDER — KETOCONAZOLE 20 MG/G
CREAM TOPICAL
Qty: 1 TUBE | Refills: 1 | Status: SHIPPED | OUTPATIENT
Start: 2018-08-01 | End: 2018-08-27 | Stop reason: SDUPTHER

## 2018-08-01 RX ORDER — NYSTATIN 100000 [USP'U]/G
POWDER TOPICAL
Qty: 56.7 G | Refills: 3 | Status: SHIPPED
Start: 2018-08-01 | End: 2020-05-29 | Stop reason: SDUPTHER

## 2018-08-01 ASSESSMENT — ENCOUNTER SYMPTOMS
ABDOMINAL PAIN: 1
SHORTNESS OF BREATH: 0
CHEST TIGHTNESS: 0
WHEEZING: 0
VOMITING: 0
CONSTIPATION: 0
NAUSEA: 0
ABDOMINAL DISTENTION: 0
COUGH: 0
DIARRHEA: 1

## 2018-08-01 ASSESSMENT — PATIENT HEALTH QUESTIONNAIRE - PHQ9
4. FEELING TIRED OR HAVING LITTLE ENERGY: 1
10. IF YOU CHECKED OFF ANY PROBLEMS, HOW DIFFICULT HAVE THESE PROBLEMS MADE IT FOR YOU TO DO YOUR WORK, TAKE CARE OF THINGS AT HOME, OR GET ALONG WITH OTHER PEOPLE: 1
SUM OF ALL RESPONSES TO PHQ9 QUESTIONS 1 & 2: 3
SUM OF ALL RESPONSES TO PHQ QUESTIONS 1-9: 10
8. MOVING OR SPEAKING SO SLOWLY THAT OTHER PEOPLE COULD HAVE NOTICED. OR THE OPPOSITE, BEING SO FIGETY OR RESTLESS THAT YOU HAVE BEEN MOVING AROUND A LOT MORE THAN USUAL: 1
9. THOUGHTS THAT YOU WOULD BE BETTER OFF DEAD, OR OF HURTING YOURSELF: 0
3. TROUBLE FALLING OR STAYING ASLEEP: 0
5. POOR APPETITE OR OVEREATING: 1
2. FEELING DOWN, DEPRESSED OR HOPELESS: 1
7. TROUBLE CONCENTRATING ON THINGS, SUCH AS READING THE NEWSPAPER OR WATCHING TELEVISION: 3
1. LITTLE INTEREST OR PLEASURE IN DOING THINGS: 2
6. FEELING BAD ABOUT YOURSELF - OR THAT YOU ARE A FAILURE OR HAVE LET YOURSELF OR YOUR FAMILY DOWN: 1

## 2018-08-01 NOTE — PROGRESS NOTES
anhedonia, difficulty concentrating, fatigue, feelings of worthlessness/guilt and hopelessness. Denies suicidal ideation, plan or intent. PHQ Scores 8/1/2018 2/6/2018 1/4/2018 2/22/2017   PHQ2 Score 3 4 3 4   PHQ9 Score 10 14 22 12     Interpretation of Total Score Depression Severity: 1-4 = Minimal depression, 5-9 = Mild depression, 10-14 = Moderate depression, 15-19 = Moderately severe depression, 20-27 = Severe depression    The patient's past medical, surgical, social, and family history as well as her current medications and allergies were reviewed as documented in today's encounter. Rest of complaints with corresponding details per ROS    Review of Systems   Constitutional: Positive for fatigue. Negative for activity change, appetite change, chills, diaphoresis, fever and unexpected weight change. Respiratory: Negative for cough, chest tightness, shortness of breath and wheezing. Cardiovascular: Positive for leg swelling (left leg only). Negative for chest pain and palpitations. Gastrointestinal: Positive for abdominal pain (intermittent) and diarrhea. Negative for abdominal distention, constipation, nausea and vomiting. Bouts of diarrhea on and off for 3 days at a time, watery. Endocrine: Negative for cold intolerance, heat intolerance, polydipsia, polyphagia and polyuria. Skin: Positive for rash (feet). Psychiatric/Behavioral: Positive for dysphoric mood and sleep disturbance. Negative for hallucinations, self-injury and suicidal ideas. The patient is nervous/anxious.          -vital signs stable and within normal limits except mild bradycardia and Overweight per BMI. /77   Pulse 58   Temp 98.1 °F (36.7 °C) (Tympanic)   Ht 5' 10\" (1.778 m)   Wt 192 lb 3.2 oz (87.2 kg)   LMP  (LMP Unknown)   SpO2 99%   BMI 27.58 kg/m²        Physical Exam   Constitutional: She is oriented to person, place, and time. She appears well-developed and well-nourished. No distress. HENT:   Head: Normocephalic and atraumatic. Mouth/Throat: Oropharynx is clear and moist. No oropharyngeal exudate. Eyes: Conjunctivae and EOM are normal. Right eye exhibits no discharge. Left eye exhibits no discharge. No scleral icterus. Neck: Normal range of motion. Neck supple. No thyromegaly present. Cardiovascular: Normal rate, regular rhythm, normal heart sounds and intact distal pulses. Pulmonary/Chest: Effort normal and breath sounds normal. No respiratory distress. She has no wheezes. She has no rales. She exhibits no tenderness. Abdominal: Soft. Bowel sounds are normal. She exhibits no distension. There is no tenderness. Obese abdomen. Musculoskeletal: Normal range of motion. She exhibits edema (trace pitting edema left leg only. NO calf tenderness) and tenderness. Left foot: There is tenderness (left great toe) and bony tenderness. There is no swelling and no deformity. Mild tenderness at the base of the left great toe. There is no swelling, no ecchymosis, no deformity. Neurological: She is alert and oriented to person, place, and time. No cranial nerve deficit. She exhibits normal muscle tone. Skin: Skin is warm and dry. Rash noted. She is not diaphoretic. Bilateral feet exam shows very thick dystrophic toenails bilaterally, severe onychomycosis affecting the whole toenails. There is skin maceration between all interdigital spaces and tinea pedis on the plantar aspect. Mild tenderness at the base of the left great toe   Psychiatric: Her behavior is normal. Judgment and thought content normal. Her mood appears anxious. Her affect is labile. Nursing note and vitals reviewed. Discussed testing with the patient and all questions fully answered.     Labs within normal limits   Hospital Outpatient Visit on 04/20/2018   Component Date Value Ref Range Status    WBC 04/20/2018 6.2  3.5 - 11.0 k/uL Final    RBC 04/20/2018 4.31  4.0 - 5.2 m/uL Final    Hemoglobin 04/20/2018 12.6  12.0 - 16.0 g/dL Final    Hematocrit 04/20/2018 38.1  36 - 46 % Final    MCV 04/20/2018 88.4  80 - 100 fL Final    MCH 04/20/2018 29.3  26 - 34 pg Final    MCHC 04/20/2018 33.1  31 - 37 g/dL Final    RDW 04/20/2018 13.4  11.5 - 14.9 % Final    Platelets 63/28/8075 272  150 - 450 k/uL Final    MPV 04/20/2018 8.3  6.0 - 12.0 fL Final    Comment: Performed at [facility]: [facility] [address] [facility]. [address]   [phone]      NRBC Automated 04/20/2018 NOT REPORTED  per 100 WBC Final    TSH 04/20/2018 1.17  0.30 - 5.00 mIU/L Final    Comment: Performed at [facility]: [facility] [address] [facility]. [address]   [phone]      Glucose 04/20/2018 85  70 - 99 mg/dL Final    BUN 04/20/2018 12  6 - 20 mg/dL Final    CREATININE 04/20/2018 0.67  0.50 - 0.90 mg/dL Final    Bun/Cre Ratio 04/20/2018 NOT REPORTED  9 - 20 Final    Calcium 04/20/2018 9.4  8.6 - 10.4 mg/dL Final    Sodium 04/20/2018 137  135 - 144 mmol/L Final    Potassium 04/20/2018 3.8  3.7 - 5.3 mmol/L Final    Chloride 04/20/2018 102  98 - 107 mmol/L Final    CO2 04/20/2018 25  20 - 31 mmol/L Final    Anion Gap 04/20/2018 10  9 - 17 mmol/L Final    GFR Non- 04/20/2018 >60  >60 mL/min Final    GFR  04/20/2018 >60  >60 mL/min Final    GFR Comment 04/20/2018        Final    Comment: Average GFR for 38-51 years old:   80 mL/min/1.73sq m  Chronic Kidney Disease:   <60 mL/min/1.73sq m  Kidney failure:   <15 mL/min/1.73sq m              eGFR calculated using average adult body mass. Additional eGFR calculator   available at:        [email or web address]        Performed at [facility]: [facility] [address] [place] [address] [place].  [address]   [phone]      GFR Staging 04/20/2018 NOT REPORTED   Final       Most recent labs reviewed:    Lab Results   Component Value Date    WBC 6.2 04/20/2018    HGB 12.6 04/20/2018    HCT 38.1 current medications, diet and exercise. Discussed use, benefit, and side effects of prescribed medications. Barriers to medication compliance addressed. Patient given educational materials - see patient instructions  Was a self-tracking handout given in paper form or via [facility]? Yes    Requested Prescriptions     Signed Prescriptions Disp Refills    nystatin (MYCOSTATIN) 019926 UNIT/GM powder 56.7 g 3     Sig: Apply 2-3 times daily in between the toes x 3-4 mo    ketoconazole (NIZORAL) 2 % cream 1 Tube 1     Sig: Apply topically  2 times a day x feet for 3-4 mo    terbinafine (LAMISIL) 250 MG tablet 84 tablet 0     Sig: Take 1 tablet by mouth daily    Lactobacillus (PROBIOTIC ACIDOPHILUS) TABS 90 tablet 3     Sig: Take 1 tablet by mouth daily       All patient questions answered. Patient voiced understanding. Quality Measures    Body mass index is 27.58 kg/m². Elevated. Weight control planned discussed conventional weight loss and Healthy diet and regular exercise. BP: 113/77 Blood pressure is normal. Treatment plan consists of Weight Reduction, DASH Eating Plan, Dietary Sodium Restriction, Increased Physical Activity, Patient In-home Blood Pressure Monitoring and No treatment change needed. LDL controlled   Lab Results   Component Value Date    LDLCHOLESTEROL 87 05/09/2017    (goal LDL reduction with dx if diabetes is 50% LDL reduction)      Moderate depression, improved from prior    [facility] Scores 8/1/2018 2/6/2018 1/4/2018 2/22/2017   PHQ2 Score 3 4 3 4   PHQ9 Score 10 14 22 12     Interpretation of Total Score Depression Severity: 1-4 = Minimal depression, 5-9 = Mild depression, 10-14 = Moderate depression, 15-19 = Moderately severe depression, 20-27 = Severe depression      The patient's past medical, surgical, social, and family history as well as her   current medications and allergies were reviewed as documented in today's encounter.       Medications, labs, diagnostic studies, consultations and follow-up as documented in this encounter. Return in about 3 months (around 11/1/2018) for HTN, LABS F/U, tinea pedis. Patient was seen with total face to face time of  25 minutes. More than 50% of this visit was counseling and education. Future Appointments  Date Time Provider Candie Apolonia   11/1/2018 9:00 AM [facility]        This note was completed by using the assistance of a speech-recognition program. However, inadvertent computerized transcription errors may be present. Although every effort was made to ensure accuracy, no guarantees can be provided that every mistake has been identified and corrected by editing.     Electronically signed by Ze Arvizu MD on 8/6/2018 at 8:38 AM

## 2018-08-06 PROBLEM — B35.1 ONYCHOMYCOSIS OF TOENAIL: Status: ACTIVE | Noted: 2018-08-06

## 2018-08-06 PROBLEM — B35.3 TINEA PEDIS OF BOTH FEET: Status: ACTIVE | Noted: 2018-08-06

## 2018-08-06 PROBLEM — S92.415A NONDISPLACED FRACTURE OF PROXIMAL PHALANX OF LEFT GREAT TOE, INITIAL ENCOUNTER FOR CLOSED FRACTURE: Status: ACTIVE | Noted: 2018-08-06

## 2018-08-27 DIAGNOSIS — B35.3 TINEA PEDIS OF BOTH FEET: ICD-10-CM

## 2018-08-27 DIAGNOSIS — B35.1 ONYCHOMYCOSIS OF TOENAIL: ICD-10-CM

## 2018-08-27 RX ORDER — KETOCONAZOLE 20 MG/G
CREAM TOPICAL
Qty: 30 TUBE | Refills: 0 | Status: SHIPPED | OUTPATIENT
Start: 2018-08-27 | End: 2018-10-23 | Stop reason: SDUPTHER

## 2018-08-27 NOTE — TELEPHONE ENCOUNTER
Please Approve or Refuse.   Send to Pharmacy per Pt's Request:      Next Visit Date:  11/1/2018   Last Visit Date: 8/1/2018    No results found for: LABA1C          ( goal A1C is < 7)   BP Readings from Last 3 Encounters:   08/01/18 113/77   07/10/18 128/76   04/20/18 121/81          (goal 120/80)  Lab Results   Component Value Date    BUN 12 04/20/2018     Lab Results   Component Value Date    CREATININE 0.67 04/20/2018     Lab Results   Component Value Date    K 3.8 04/20/2018     @[id number])@

## 2018-10-19 DIAGNOSIS — J45.40 MODERATE PERSISTENT ASTHMA WITHOUT COMPLICATION: ICD-10-CM

## 2018-10-23 DIAGNOSIS — B35.1 ONYCHOMYCOSIS OF TOENAIL: ICD-10-CM

## 2018-10-23 DIAGNOSIS — B35.3 TINEA PEDIS OF BOTH FEET: ICD-10-CM

## 2018-10-23 RX ORDER — KETOCONAZOLE 20 MG/G
CREAM TOPICAL
Qty: 30 TUBE | Refills: 3 | Status: SHIPPED | OUTPATIENT
Start: 2018-10-23 | End: 2019-02-18 | Stop reason: SDUPTHER

## 2018-11-01 ENCOUNTER — OFFICE VISIT (OUTPATIENT)
Dept: FAMILY MEDICINE CLINIC | Age: 48
End: 2018-11-01
Payer: COMMERCIAL

## 2018-11-01 ENCOUNTER — HOSPITAL ENCOUNTER (OUTPATIENT)
Age: 48
Discharge: HOME | End: 2018-11-01
Payer: COMMERCIAL

## 2018-11-01 VITALS
SYSTOLIC BLOOD PRESSURE: 122 MMHG | DIASTOLIC BLOOD PRESSURE: 78 MMHG | HEART RATE: 69 BPM | BODY MASS INDEX: 26.68 KG/M2 | OXYGEN SATURATION: 98 % | WEIGHT: 190.6 LBS | HEIGHT: 71 IN

## 2018-11-01 DIAGNOSIS — R53.82 CHRONIC FATIGUE: ICD-10-CM

## 2018-11-01 DIAGNOSIS — I10 ESSENTIAL HYPERTENSION: ICD-10-CM

## 2018-11-01 DIAGNOSIS — H01.00A BLEPHARITIS OF UPPER AND LOWER EYELIDS OF BOTH EYES, UNSPECIFIED TYPE: Primary | ICD-10-CM

## 2018-11-01 DIAGNOSIS — H01.00B BLEPHARITIS OF UPPER AND LOWER EYELIDS OF BOTH EYES, UNSPECIFIED TYPE: Primary | ICD-10-CM

## 2018-11-01 DIAGNOSIS — E55.9 VITAMIN D DEFICIENCY: ICD-10-CM

## 2018-11-01 DIAGNOSIS — J45.40 MODERATE PERSISTENT ASTHMA WITHOUT COMPLICATION: ICD-10-CM

## 2018-11-01 DIAGNOSIS — D64.9 ANEMIA, UNSPECIFIED TYPE: Primary | ICD-10-CM

## 2018-11-01 LAB
ALBUMIN SERPL-MCNC: 4.1 G/DL (ref 3.5–5.2)
ALBUMIN/GLOBULIN RATIO: ABNORMAL (ref 1–2.5)
ALP BLD-CCNC: 84 U/L (ref 35–104)
ALT SERPL-CCNC: 16 U/L (ref 5–33)
ANION GAP SERPL CALCULATED.3IONS-SCNC: 9 MMOL/L (ref 9–17)
AST SERPL-CCNC: 24 U/L
BILIRUB SERPL-MCNC: 0.24 MG/DL (ref 0.3–1.2)
BUN BLDV-MCNC: 8 MG/DL (ref 6–20)
BUN/CREAT BLD: ABNORMAL (ref 9–20)
CALCIUM SERPL-MCNC: 9.3 MG/DL (ref 8.6–10.4)
CHLORIDE BLD-SCNC: 102 MMOL/L (ref 98–107)
CO2: 30 MMOL/L (ref 20–31)
CREAT SERPL-MCNC: 0.75 MG/DL (ref 0.5–0.9)
GFR AFRICAN AMERICAN: >60 ML/MIN
GFR NON-AFRICAN AMERICAN: >60 ML/MIN
GFR SERPL CREATININE-BSD FRML MDRD: ABNORMAL ML/MIN/{1.73_M2}
GFR SERPL CREATININE-BSD FRML MDRD: ABNORMAL ML/MIN/{1.73_M2}
GLUCOSE BLD-MCNC: 81 MG/DL (ref 70–99)
HCT VFR BLD CALC: 34.5 % (ref 36–46)
HEMOGLOBIN: 11.3 G/DL (ref 12–16)
MCH RBC QN AUTO: 29.1 PG (ref 26–34)
MCHC RBC AUTO-ENTMCNC: 32.7 G/DL (ref 31–37)
MCV RBC AUTO: 89 FL (ref 80–100)
NRBC AUTOMATED: ABNORMAL PER 100 WBC
PDW BLD-RTO: 13.8 % (ref 11.5–14.9)
PLATELET # BLD: 293 K/UL (ref 150–450)
PMV BLD AUTO: 8.7 FL (ref 6–12)
POTASSIUM SERPL-SCNC: 3.8 MMOL/L (ref 3.7–5.3)
RBC # BLD: 3.88 M/UL (ref 4–5.2)
SODIUM BLD-SCNC: 141 MMOL/L (ref 135–144)
TOTAL PROTEIN: 7.8 G/DL (ref 6.4–8.3)
VITAMIN D 25-HYDROXY: 22.6 NG/ML (ref 30–100)
WBC # BLD: 5.1 K/UL (ref 3.5–11)

## 2018-11-01 PROCEDURE — G8417 CALC BMI ABV UP PARAM F/U: HCPCS | Performed by: FAMILY MEDICINE

## 2018-11-01 PROCEDURE — G8427 DOCREV CUR MEDS BY ELIG CLIN: HCPCS | Performed by: FAMILY MEDICINE

## 2018-11-01 PROCEDURE — 1036F TOBACCO NON-USER: CPT | Performed by: FAMILY MEDICINE

## 2018-11-01 PROCEDURE — 85027 COMPLETE CBC AUTOMATED: CPT

## 2018-11-01 PROCEDURE — 36415 COLL VENOUS BLD VENIPUNCTURE: CPT

## 2018-11-01 PROCEDURE — 99214 OFFICE O/P EST MOD 30 MIN: CPT | Performed by: FAMILY MEDICINE

## 2018-11-01 PROCEDURE — 80053 COMPREHEN METABOLIC PANEL: CPT

## 2018-11-01 PROCEDURE — G8484 FLU IMMUNIZE NO ADMIN: HCPCS | Performed by: FAMILY MEDICINE

## 2018-11-01 PROCEDURE — 82306 VITAMIN D 25 HYDROXY: CPT

## 2018-11-01 RX ORDER — BACITRACIN ZINC AND POLYMYXIN B SULFATE 500; 10000 [USP'U]/G; [USP'U]/G
0.5 OINTMENT OPHTHALMIC 2 TIMES DAILY
Qty: 3.5 G | Refills: 3 | Status: SHIPPED | OUTPATIENT
Start: 2018-11-01 | End: 2018-11-11

## 2018-11-01 RX ORDER — ERGOCALCIFEROL 1.25 MG/1
50000 CAPSULE ORAL WEEKLY
Qty: 12 CAPSULE | Refills: 0 | Status: SHIPPED | OUTPATIENT
Start: 2018-11-01 | End: 2018-12-21 | Stop reason: SDUPTHER

## 2018-11-01 RX ORDER — AMLODIPINE BESYLATE 5 MG/1
5 TABLET ORAL DAILY
Qty: 90 TABLET | Refills: 3 | Status: SHIPPED | OUTPATIENT
Start: 2018-11-01 | End: 2019-03-04 | Stop reason: SDUPTHER

## 2018-11-01 RX ORDER — FERROUS SULFATE 325(65) MG
325 TABLET ORAL DAILY
Qty: 90 TABLET | Refills: 3 | Status: SHIPPED | OUTPATIENT
Start: 2018-11-01 | End: 2019-03-07 | Stop reason: ALTCHOICE

## 2018-11-01 ASSESSMENT — ENCOUNTER SYMPTOMS
DIARRHEA: 0
ABDOMINAL PAIN: 0
CONSTIPATION: 0
EYE DISCHARGE: 1
WHEEZING: 0
NAUSEA: 0
VOMITING: 0
CHEST TIGHTNESS: 0
COUGH: 0
ABDOMINAL DISTENTION: 0

## 2018-11-01 ASSESSMENT — ASTHMA QUESTIONNAIRES
ACT_TOTALSCORE: 17
QUESTION_2 LAST FOUR WEEKS HOW OFTEN HAVE YOU HAD SHORTNESS OF BREATH: 4
QUESTION_4 LAST FOUR WEEKS HOW OFTEN HAVE YOU USED YOUR RESCUE INHALER OR NEBULIZER MEDICATION (SUCH AS ALBUTEROL): 2
QUESTION_5 LAST FOUR WEEKS HOW WOULD YOU RATE YOUR ASTHMA CONTROL: 3
QUESTION_1 LAST FOUR WEEKS HOW MUCH OF THE TIME DID YOUR ASTHMA KEEP YOU FROM GETTING AS MUCH DONE AT WORK, SCHOOL OR AT HOME: 3
QUESTION_3 LAST FOUR WEEKS HOW OFTEN DID YOUR ASTHMA SYMPTOMS (WHEEZING, COUGHING, SHORTNESS OF BREATH, CHEST TIGHTNESS OR PAIN) WAKE YOU UP AT NIGHT OR EARLIER THAN USUAL IN THE MORNING: 5

## 2018-11-01 NOTE — PROGRESS NOTES
person, place, and time. She appears well-developed and well-nourished. No distress. HENT:   Head: Normocephalic and atraumatic. Right Ear: External ear normal.   Left Ear: External ear normal.   Nose: Nose normal.   Mouth/Throat: Oropharynx is clear and moist. No oropharyngeal exudate. Eyes: EOM are normal. Right eye exhibits no discharge. Left eye exhibits no discharge. Right conjunctiva is injected. Right conjunctiva has no hemorrhage. Left conjunctiva is injected. Left conjunctiva has no hemorrhage. No scleral icterus. Mildly swollen right upper eyelid with a small stye, 3 mm. There is dandruff bilateral, upper and lower eyelids   Neck: Normal range of motion. Neck supple. No thyromegaly present. Cardiovascular: Normal rate, regular rhythm, normal heart sounds and intact distal pulses. Pulmonary/Chest: Effort normal and breath sounds normal. No respiratory distress. She has no wheezes. She has no rales. She exhibits no tenderness. Abdominal: Soft. Bowel sounds are normal. She exhibits no distension. There is no tenderness. Musculoskeletal: Normal range of motion. She exhibits no edema or tenderness. Neurological: She is alert and oriented to person, place, and time. No cranial nerve deficit. She exhibits normal muscle tone. Skin: Skin is warm and dry. Capillary refill takes less than 2 seconds. No rash noted. She is not diaphoretic. Psychiatric: Her behavior is normal. Judgment and thought content normal. Her mood appears anxious. Her affect is labile. Nursing note and vitals reviewed. Discussed testing with the patient and all questions fully answered.   Labs within normal limits   Had anemia before     Lab Results   Component Value Date    WBC 6.2 04/20/2018    HGB 12.6 04/20/2018    HCT 38.1 04/20/2018    MCV 88.4 04/20/2018     04/20/2018       Lab Results   Component Value Date     04/20/2018    K 3.8 04/20/2018     04/20/2018    CO2 25 04/20/2018    BUN 12

## 2018-11-05 PROBLEM — H01.00B BLEPHARITIS OF UPPER AND LOWER EYELIDS OF BOTH EYES: Status: ACTIVE | Noted: 2018-11-05

## 2018-11-05 PROBLEM — H01.00A BLEPHARITIS OF UPPER AND LOWER EYELIDS OF BOTH EYES: Status: ACTIVE | Noted: 2018-11-05

## 2018-11-05 ASSESSMENT — ENCOUNTER SYMPTOMS
EYE PAIN: 1
SHORTNESS OF BREATH: 1

## 2018-11-10 DIAGNOSIS — G44.229 CHRONIC TENSION-TYPE HEADACHE, NOT INTRACTABLE: ICD-10-CM

## 2018-11-12 RX ORDER — ACETAMINOPHEN 500 MG
TABLET ORAL
Qty: 120 TABLET | Refills: 0 | Status: SHIPPED | OUTPATIENT
Start: 2018-11-12 | End: 2018-12-21 | Stop reason: SDUPTHER

## 2018-11-12 NOTE — TELEPHONE ENCOUNTER
Please Approve or Refuse.   Send to Pharmacy per Pt's Request:      Next Visit Date:  2/1/2019   Last Visit Date: 11/1/2018    No results found for: LABA1C          ( goal A1C is < 7)   BP Readings from Last 3 Encounters:   11/01/18 122/78   08/01/18 113/77   07/10/18 128/76          (goal 120/80)  BUN   Date Value Ref Range Status   11/01/2018 8 6 - 20 mg/dL Final     CREATININE   Date Value Ref Range Status   11/01/2018 0.75 0.50 - 0.90 mg/dL Final     Potassium   Date Value Ref Range Status   11/01/2018 3.8 3.7 - 5.3 mmol/L Final

## 2018-12-21 DIAGNOSIS — E55.9 VITAMIN D DEFICIENCY: ICD-10-CM

## 2018-12-21 DIAGNOSIS — G44.229 CHRONIC TENSION-TYPE HEADACHE, NOT INTRACTABLE: ICD-10-CM

## 2018-12-21 RX ORDER — ERGOCALCIFEROL 1.25 MG/1
50000 CAPSULE ORAL WEEKLY
Qty: 12 CAPSULE | Refills: 0 | Status: SHIPPED | OUTPATIENT
Start: 2018-12-21 | End: 2019-06-15 | Stop reason: SDUPTHER

## 2018-12-21 RX ORDER — ACETAMINOPHEN 500 MG/1
TABLET ORAL
Qty: 120 TABLET | Refills: 0 | Status: SHIPPED | OUTPATIENT
Start: 2018-12-21 | End: 2019-03-08 | Stop reason: SDUPTHER

## 2019-02-18 DIAGNOSIS — B35.1 ONYCHOMYCOSIS OF TOENAIL: ICD-10-CM

## 2019-02-18 DIAGNOSIS — B35.3 TINEA PEDIS OF BOTH FEET: ICD-10-CM

## 2019-02-19 RX ORDER — KETOCONAZOLE 20 MG/G
CREAM TOPICAL
Qty: 30 G | Refills: 3 | Status: SHIPPED | OUTPATIENT
Start: 2019-02-19 | End: 2019-06-10 | Stop reason: SDUPTHER

## 2019-02-26 ENCOUNTER — TELEPHONE (OUTPATIENT)
Dept: FAMILY MEDICINE CLINIC | Age: 49
End: 2019-02-26

## 2019-02-26 DIAGNOSIS — J45.40 MODERATE PERSISTENT ASTHMA WITHOUT COMPLICATION: Primary | ICD-10-CM

## 2019-02-26 RX ORDER — ALBUTEROL SULFATE 90 UG/1
2 AEROSOL, METERED RESPIRATORY (INHALATION) EVERY 6 HOURS PRN
Qty: 8 G | Refills: 3 | Status: SHIPPED | OUTPATIENT
Start: 2019-02-26 | End: 2021-02-18 | Stop reason: SDUPTHER

## 2019-03-04 DIAGNOSIS — I10 ESSENTIAL HYPERTENSION: ICD-10-CM

## 2019-03-04 RX ORDER — AMLODIPINE BESYLATE 5 MG/1
5 TABLET ORAL DAILY
Qty: 90 TABLET | Refills: 3 | Status: SHIPPED | OUTPATIENT
Start: 2019-03-04 | End: 2020-03-24

## 2019-03-07 ENCOUNTER — OFFICE VISIT (OUTPATIENT)
Dept: FAMILY MEDICINE CLINIC | Age: 49
End: 2019-03-07
Payer: COMMERCIAL

## 2019-03-07 VITALS
WEIGHT: 191.8 LBS | HEIGHT: 71 IN | OXYGEN SATURATION: 98 % | SYSTOLIC BLOOD PRESSURE: 120 MMHG | BODY MASS INDEX: 26.85 KG/M2 | DIASTOLIC BLOOD PRESSURE: 81 MMHG | HEART RATE: 70 BPM

## 2019-03-07 DIAGNOSIS — I10 ESSENTIAL HYPERTENSION: Primary | ICD-10-CM

## 2019-03-07 DIAGNOSIS — J45.40 MODERATE PERSISTENT ASTHMA WITHOUT COMPLICATION: ICD-10-CM

## 2019-03-07 DIAGNOSIS — D50.8 OTHER IRON DEFICIENCY ANEMIA: ICD-10-CM

## 2019-03-07 DIAGNOSIS — Z13.6 SCREENING FOR CARDIOVASCULAR CONDITION: ICD-10-CM

## 2019-03-07 DIAGNOSIS — Z12.31 ENCOUNTER FOR SCREENING MAMMOGRAM FOR BREAST CANCER: ICD-10-CM

## 2019-03-07 DIAGNOSIS — E55.9 VITAMIN D DEFICIENCY: ICD-10-CM

## 2019-03-07 DIAGNOSIS — K21.9 GASTROESOPHAGEAL REFLUX DISEASE WITHOUT ESOPHAGITIS: ICD-10-CM

## 2019-03-07 DIAGNOSIS — Z12.11 SCREEN FOR COLON CANCER: ICD-10-CM

## 2019-03-07 DIAGNOSIS — F31.9 BIPOLAR DEPRESSION (HCC): ICD-10-CM

## 2019-03-07 DIAGNOSIS — L03.011 PARONYCHIA OF FINGER OF RIGHT HAND: ICD-10-CM

## 2019-03-07 DIAGNOSIS — R53.82 CHRONIC FATIGUE: ICD-10-CM

## 2019-03-07 PROCEDURE — G8417 CALC BMI ABV UP PARAM F/U: HCPCS | Performed by: FAMILY MEDICINE

## 2019-03-07 PROCEDURE — G8427 DOCREV CUR MEDS BY ELIG CLIN: HCPCS | Performed by: FAMILY MEDICINE

## 2019-03-07 PROCEDURE — 1036F TOBACCO NON-USER: CPT | Performed by: FAMILY MEDICINE

## 2019-03-07 PROCEDURE — G8484 FLU IMMUNIZE NO ADMIN: HCPCS | Performed by: FAMILY MEDICINE

## 2019-03-07 PROCEDURE — 96160 PT-FOCUSED HLTH RISK ASSMT: CPT | Performed by: FAMILY MEDICINE

## 2019-03-07 PROCEDURE — 99214 OFFICE O/P EST MOD 30 MIN: CPT | Performed by: FAMILY MEDICINE

## 2019-03-07 RX ORDER — FLUTICASONE PROPIONATE 220 UG/1
2 AEROSOL, METERED RESPIRATORY (INHALATION) 2 TIMES DAILY
Qty: 1 INHALER | Refills: 11 | Status: SHIPPED | OUTPATIENT
Start: 2019-03-07 | End: 2020-03-24

## 2019-03-07 RX ORDER — MIRTAZAPINE 15 MG/1
15 TABLET, FILM COATED ORAL NIGHTLY
Qty: 90 TABLET | Refills: 1 | Status: SHIPPED | OUTPATIENT
Start: 2019-03-07 | End: 2019-06-14 | Stop reason: SDUPTHER

## 2019-03-07 RX ORDER — MUPIROCIN CALCIUM 20 MG/G
CREAM TOPICAL
Qty: 30 G | Refills: 3 | Status: SHIPPED | OUTPATIENT
Start: 2019-03-07 | End: 2019-04-01 | Stop reason: SDUPTHER

## 2019-03-07 ASSESSMENT — ASTHMA QUESTIONNAIRES
QUESTION_3 LAST FOUR WEEKS HOW OFTEN DID YOUR ASTHMA SYMPTOMS (WHEEZING, COUGHING, SHORTNESS OF BREATH, CHEST TIGHTNESS OR PAIN) WAKE YOU UP AT NIGHT OR EARLIER THAN USUAL IN THE MORNING: 5
QUESTION_1 LAST FOUR WEEKS HOW MUCH OF THE TIME DID YOUR ASTHMA KEEP YOU FROM GETTING AS MUCH DONE AT WORK, SCHOOL OR AT HOME: 3
QUESTION_2 LAST FOUR WEEKS HOW OFTEN HAVE YOU HAD SHORTNESS OF BREATH: 5
QUESTION_5 LAST FOUR WEEKS HOW WOULD YOU RATE YOUR ASTHMA CONTROL: 5
QUESTION_4 LAST FOUR WEEKS HOW OFTEN HAVE YOU USED YOUR RESCUE INHALER OR NEBULIZER MEDICATION (SUCH AS ALBUTEROL): 5
ACT_TOTALSCORE: 23

## 2019-03-07 ASSESSMENT — ENCOUNTER SYMPTOMS
COUGH: 0
ABDOMINAL DISTENTION: 0
CHEST TIGHTNESS: 0
SHORTNESS OF BREATH: 0
ABDOMINAL PAIN: 0
DIARRHEA: 1
WHEEZING: 0
CONSTIPATION: 0
NAUSEA: 1
VOMITING: 0

## 2019-03-07 ASSESSMENT — PATIENT HEALTH QUESTIONNAIRE - PHQ9
2. FEELING DOWN, DEPRESSED OR HOPELESS: 1
6. FEELING BAD ABOUT YOURSELF - OR THAT YOU ARE A FAILURE OR HAVE LET YOURSELF OR YOUR FAMILY DOWN: 0
SUM OF ALL RESPONSES TO PHQ9 QUESTIONS 1 & 2: 2
5. POOR APPETITE OR OVEREATING: 1
1. LITTLE INTEREST OR PLEASURE IN DOING THINGS: 1
SUM OF ALL RESPONSES TO PHQ QUESTIONS 1-9: 6
7. TROUBLE CONCENTRATING ON THINGS, SUCH AS READING THE NEWSPAPER OR WATCHING TELEVISION: 1
10. IF YOU CHECKED OFF ANY PROBLEMS, HOW DIFFICULT HAVE THESE PROBLEMS MADE IT FOR YOU TO DO YOUR WORK, TAKE CARE OF THINGS AT HOME, OR GET ALONG WITH OTHER PEOPLE: 1
SUM OF ALL RESPONSES TO PHQ QUESTIONS 1-9: 6
3. TROUBLE FALLING OR STAYING ASLEEP: 1
8. MOVING OR SPEAKING SO SLOWLY THAT OTHER PEOPLE COULD HAVE NOTICED. OR THE OPPOSITE, BEING SO FIGETY OR RESTLESS THAT YOU HAVE BEEN MOVING AROUND A LOT MORE THAN USUAL: 0
4. FEELING TIRED OR HAVING LITTLE ENERGY: 1
9. THOUGHTS THAT YOU WOULD BE BETTER OFF DEAD, OR OF HURTING YOURSELF: 0

## 2019-03-08 DIAGNOSIS — G44.229 CHRONIC TENSION-TYPE HEADACHE, NOT INTRACTABLE: ICD-10-CM

## 2019-03-08 RX ORDER — ACETAMINOPHEN 500 MG/1
TABLET ORAL
Qty: 120 TABLET | Refills: 0 | Status: SHIPPED | OUTPATIENT
Start: 2019-03-08 | End: 2019-04-03 | Stop reason: SDUPTHER

## 2019-03-12 PROBLEM — Z13.31 POSITIVE DEPRESSION SCREENING: Status: RESOLVED | Noted: 2017-02-26 | Resolved: 2019-03-12

## 2019-03-12 PROBLEM — H01.00B BLEPHARITIS OF UPPER AND LOWER EYELIDS OF BOTH EYES: Status: RESOLVED | Noted: 2018-11-05 | Resolved: 2019-03-12

## 2019-03-12 PROBLEM — H01.00A BLEPHARITIS OF UPPER AND LOWER EYELIDS OF BOTH EYES: Status: RESOLVED | Noted: 2018-11-05 | Resolved: 2019-03-12

## 2019-03-12 PROBLEM — E66.9 OBESITY (BMI 30.0-34.9): Status: RESOLVED | Noted: 2017-07-21 | Resolved: 2019-03-12

## 2019-03-12 PROBLEM — R22.9 SINGLE SKIN NODULE: Status: RESOLVED | Noted: 2018-04-28 | Resolved: 2019-03-12

## 2019-03-12 PROBLEM — S92.415A NONDISPLACED FRACTURE OF PROXIMAL PHALANX OF LEFT GREAT TOE, INITIAL ENCOUNTER FOR CLOSED FRACTURE: Status: RESOLVED | Noted: 2018-08-06 | Resolved: 2019-03-12

## 2019-03-12 PROBLEM — R63.5 UNINTENDED WEIGHT GAIN: Status: RESOLVED | Noted: 2017-02-26 | Resolved: 2019-03-12

## 2019-03-12 PROBLEM — R07.89 ATYPICAL CHEST PAIN: Status: RESOLVED | Noted: 2018-04-20 | Resolved: 2019-03-12

## 2019-03-12 ASSESSMENT — ENCOUNTER SYMPTOMS: BLOOD IN STOOL: 0

## 2019-04-01 DIAGNOSIS — L03.011 PARONYCHIA OF FINGER OF RIGHT HAND: ICD-10-CM

## 2019-04-02 RX ORDER — MUPIROCIN CALCIUM 20 MG/G
CREAM TOPICAL
Qty: 30 G | Refills: 3 | Status: SHIPPED | OUTPATIENT
Start: 2019-04-02

## 2019-04-03 DIAGNOSIS — G44.229 CHRONIC TENSION-TYPE HEADACHE, NOT INTRACTABLE: ICD-10-CM

## 2019-04-03 RX ORDER — ACETAMINOPHEN 500 MG/1
TABLET ORAL
Qty: 120 TABLET | Refills: 5 | Status: SHIPPED | OUTPATIENT
Start: 2019-04-03 | End: 2020-02-20 | Stop reason: SDUPTHER

## 2019-04-03 NOTE — TELEPHONE ENCOUNTER
Please Approve or Refuse.   Send to Pharmacy per Pt's Request:      Next Visit Date:  6/7/2019   Last Visit Date: 3/7/2019    No results found for: LABA1C          ( goal A1C is < 7)   BP Readings from Last 3 Encounters:   03/07/19 120/81   11/01/18 122/78   08/01/18 113/77          (goal 120/80)  BUN   Date Value Ref Range Status   11/01/2018 8 6 - 20 mg/dL Final     CREATININE   Date Value Ref Range Status   11/01/2018 0.75 0.50 - 0.90 mg/dL Final     Potassium   Date Value Ref Range Status   11/01/2018 3.8 3.7 - 5.3 mmol/L Final

## 2019-06-10 DIAGNOSIS — B35.3 TINEA PEDIS OF BOTH FEET: ICD-10-CM

## 2019-06-10 DIAGNOSIS — B35.1 ONYCHOMYCOSIS OF TOENAIL: ICD-10-CM

## 2019-06-10 RX ORDER — KETOCONAZOLE 20 MG/G
CREAM TOPICAL
Qty: 30 G | Refills: 3 | Status: SHIPPED
Start: 2019-06-10 | End: 2020-05-29 | Stop reason: SDUPTHER

## 2019-06-10 NOTE — TELEPHONE ENCOUNTER
Please Approve or Refuse.   Send to Pharmacy per Pt's Request:     Next Visit Date:  6/14/2019   Last Visit Date: 3/7/2019    No results found for: LABA1C          ( goal A1C is < 7)   BP Readings from Last 3 Encounters:   03/07/19 120/81   11/01/18 122/78   08/01/18 113/77          (goal 120/80)  BUN   Date Value Ref Range Status   11/01/2018 8 6 - 20 mg/dL Final     CREATININE   Date Value Ref Range Status   11/01/2018 0.75 0.50 - 0.90 mg/dL Final     Potassium   Date Value Ref Range Status   11/01/2018 3.8 3.7 - 5.3 mmol/L Final

## 2019-06-14 ENCOUNTER — HOSPITAL ENCOUNTER (OUTPATIENT)
Age: 49
Setting detail: SPECIMEN
Discharge: HOME | End: 2019-06-14
Payer: COMMERCIAL

## 2019-06-14 ENCOUNTER — OFFICE VISIT (OUTPATIENT)
Dept: FAMILY MEDICINE CLINIC | Age: 49
End: 2019-06-14
Payer: COMMERCIAL

## 2019-06-14 VITALS
DIASTOLIC BLOOD PRESSURE: 76 MMHG | SYSTOLIC BLOOD PRESSURE: 118 MMHG | BODY MASS INDEX: 25.84 KG/M2 | HEIGHT: 71 IN | WEIGHT: 184.6 LBS | OXYGEN SATURATION: 95 % | HEART RATE: 68 BPM

## 2019-06-14 DIAGNOSIS — I10 ESSENTIAL HYPERTENSION: ICD-10-CM

## 2019-06-14 DIAGNOSIS — Z13.6 SCREENING FOR CARDIOVASCULAR CONDITION: ICD-10-CM

## 2019-06-14 DIAGNOSIS — R63.4 UNINTENDED WEIGHT LOSS: ICD-10-CM

## 2019-06-14 DIAGNOSIS — F31.9 BIPOLAR DEPRESSION (HCC): ICD-10-CM

## 2019-06-14 DIAGNOSIS — D50.8 OTHER IRON DEFICIENCY ANEMIA: ICD-10-CM

## 2019-06-14 DIAGNOSIS — I10 ESSENTIAL HYPERTENSION: Primary | ICD-10-CM

## 2019-06-14 DIAGNOSIS — R53.82 CHRONIC FATIGUE: ICD-10-CM

## 2019-06-14 DIAGNOSIS — E55.9 VITAMIN D DEFICIENCY: ICD-10-CM

## 2019-06-14 LAB
ALBUMIN SERPL-MCNC: 4.2 G/DL (ref 3.5–5.2)
ALBUMIN/GLOBULIN RATIO: ABNORMAL (ref 1–2.5)
ALP BLD-CCNC: 84 U/L (ref 35–104)
ALT SERPL-CCNC: 20 U/L (ref 5–33)
ANION GAP SERPL CALCULATED.3IONS-SCNC: 9 MMOL/L (ref 9–17)
AST SERPL-CCNC: 27 U/L
BILIRUB SERPL-MCNC: 0.24 MG/DL (ref 0.3–1.2)
BUN BLDV-MCNC: 14 MG/DL (ref 6–20)
BUN/CREAT BLD: ABNORMAL (ref 9–20)
CALCIUM SERPL-MCNC: 9.4 MG/DL (ref 8.6–10.4)
CHLORIDE BLD-SCNC: 101 MMOL/L (ref 98–107)
CHOLESTEROL/HDL RATIO: 2.5
CHOLESTEROL: 141 MG/DL
CO2: 28 MMOL/L (ref 20–31)
CREAT SERPL-MCNC: 0.81 MG/DL (ref 0.5–0.9)
FOLATE: >20 NG/ML
GFR AFRICAN AMERICAN: >60 ML/MIN
GFR NON-AFRICAN AMERICAN: >60 ML/MIN
GFR SERPL CREATININE-BSD FRML MDRD: ABNORMAL ML/MIN/{1.73_M2}
GFR SERPL CREATININE-BSD FRML MDRD: ABNORMAL ML/MIN/{1.73_M2}
GLUCOSE BLD-MCNC: 96 MG/DL (ref 70–99)
HCT VFR BLD CALC: 37 % (ref 36–46)
HDLC SERPL-MCNC: 56 MG/DL
HEMOGLOBIN: 12.2 G/DL (ref 12–16)
IRON SATURATION: 32 % (ref 20–55)
IRON: 76 UG/DL (ref 37–145)
LDL CHOLESTEROL: 72 MG/DL (ref 0–130)
MCH RBC QN AUTO: 29.6 PG (ref 26–34)
MCHC RBC AUTO-ENTMCNC: 32.9 G/DL (ref 31–37)
MCV RBC AUTO: 89.8 FL (ref 80–100)
NRBC AUTOMATED: NORMAL PER 100 WBC
PDW BLD-RTO: 14.2 % (ref 11.5–14.9)
PLATELET # BLD: 265 K/UL (ref 150–450)
PMV BLD AUTO: 8.8 FL (ref 6–12)
POTASSIUM SERPL-SCNC: 4.6 MMOL/L (ref 3.7–5.3)
RBC # BLD: 4.12 M/UL (ref 4–5.2)
SODIUM BLD-SCNC: 138 MMOL/L (ref 135–144)
TOTAL IRON BINDING CAPACITY: 238 UG/DL (ref 250–450)
TOTAL PROTEIN: 7.5 G/DL (ref 6.4–8.3)
TRIGL SERPL-MCNC: 66 MG/DL
TSH SERPL DL<=0.05 MIU/L-ACNC: 0.7 MIU/L (ref 0.3–5)
UNSATURATED IRON BINDING CAPACITY: 162 UG/DL (ref 112–347)
VITAMIN B-12: 811 PG/ML (ref 232–1245)
VITAMIN D 25-HYDROXY: 27 NG/ML (ref 30–100)
VLDLC SERPL CALC-MCNC: NORMAL MG/DL (ref 1–30)
WBC # BLD: 5 K/UL (ref 3.5–11)

## 2019-06-14 PROCEDURE — 1036F TOBACCO NON-USER: CPT | Performed by: FAMILY MEDICINE

## 2019-06-14 PROCEDURE — 82607 VITAMIN B-12: CPT

## 2019-06-14 PROCEDURE — 80061 LIPID PANEL: CPT

## 2019-06-14 PROCEDURE — G8427 DOCREV CUR MEDS BY ELIG CLIN: HCPCS | Performed by: FAMILY MEDICINE

## 2019-06-14 PROCEDURE — 83550 IRON BINDING TEST: CPT

## 2019-06-14 PROCEDURE — 84443 ASSAY THYROID STIM HORMONE: CPT

## 2019-06-14 PROCEDURE — 36415 COLL VENOUS BLD VENIPUNCTURE: CPT

## 2019-06-14 PROCEDURE — 82746 ASSAY OF FOLIC ACID SERUM: CPT

## 2019-06-14 PROCEDURE — 82306 VITAMIN D 25 HYDROXY: CPT

## 2019-06-14 PROCEDURE — 99214 OFFICE O/P EST MOD 30 MIN: CPT | Performed by: FAMILY MEDICINE

## 2019-06-14 PROCEDURE — 83540 ASSAY OF IRON: CPT

## 2019-06-14 PROCEDURE — G8417 CALC BMI ABV UP PARAM F/U: HCPCS | Performed by: FAMILY MEDICINE

## 2019-06-14 PROCEDURE — 80053 COMPREHEN METABOLIC PANEL: CPT

## 2019-06-14 PROCEDURE — 85027 COMPLETE CBC AUTOMATED: CPT

## 2019-06-14 RX ORDER — FLUOXETINE HYDROCHLORIDE 40 MG/1
40 CAPSULE ORAL DAILY
Qty: 30 CAPSULE | Refills: 3 | Status: SHIPPED | OUTPATIENT
Start: 2019-06-14 | End: 2019-12-18

## 2019-06-14 RX ORDER — MIRTAZAPINE 30 MG/1
30 TABLET, FILM COATED ORAL NIGHTLY
Qty: 30 TABLET | Refills: 3 | Status: SHIPPED | OUTPATIENT
Start: 2019-06-14 | End: 2019-12-18

## 2019-06-14 ASSESSMENT — ENCOUNTER SYMPTOMS
COUGH: 0
VOMITING: 0
ABDOMINAL PAIN: 0
CHEST TIGHTNESS: 0
DIARRHEA: 0
WHEEZING: 0
CONSTIPATION: 0
SHORTNESS OF BREATH: 0
NAUSEA: 0
ABDOMINAL DISTENTION: 0

## 2019-06-14 ASSESSMENT — PATIENT HEALTH QUESTIONNAIRE - PHQ9
SUM OF ALL RESPONSES TO PHQ QUESTIONS 1-9: 2
2. FEELING DOWN, DEPRESSED OR HOPELESS: 1
SUM OF ALL RESPONSES TO PHQ QUESTIONS 1-9: 2
SUM OF ALL RESPONSES TO PHQ9 QUESTIONS 1 & 2: 2
1. LITTLE INTEREST OR PLEASURE IN DOING THINGS: 1

## 2019-06-14 NOTE — Clinical Note
Patient needs referral to the  for housing.   She has [facility] vouchers, cannot move to another apartment, has mold and spiders and it's making her bipolar worse, didn't go to [facility] in awhile, doesn't have  (she said)

## 2019-06-14 NOTE — PROGRESS NOTES
Visit Information    Have you changed or started any medications since your last visit including any over-the-counter medicines, vitamins, or herbal medicines? no   Are you having any side effects from any of your medications? -  no  Have you stopped taking any of your medications? Is so, why? -  no    Have you seen any other physician or provider since your last visit? No  Have you had any other diagnostic tests since your last visit? No  Have you been seen in the emergency room and/or had an admission to a hospital since we last saw you? No  Have you had your routine dental cleaning in the past 6 months? no    Have you activated your [employer] account? If not, what are your barriers?  Yes     Patient Care Team:  Dilip King MD as PCP - General (Family Medicine)  Dilip King MD as PCP - [facility]  Andrew Reyes MD as Consulting Physician (Hematology and Oncology)  Shavon Hurst MD as Consulting Physician (Gastroenterology)    Medical History Review  Past Medical, Family, and Social History reviewed and does contribute to the patient presenting condition    Health Maintenance   Topic Date Due    Breast cancer screen  01/19/2019    Flu vaccine (Season Ended) 11/01/2019 (Originally 9/1/2019)    Potassium monitoring  11/01/2019    Creatinine monitoring  11/01/2019    Lipid screen  05/09/2022    DTaP/Tdap/Td vaccine (2 - Td) 04/20/2028    Pneumococcal 0-64 years Vaccine  Completed    HIV screen  Completed

## 2019-06-14 NOTE — PROGRESS NOTES
Chief Complaint   Patient presents with    Hypertension    Discuss Labs    Manic Behavior     spider bites, black mold       Don Stiff here today for follow up on chronic medical problems, and medication refills. Hypertension; Discuss Labs; and Manic Behavior (spider bites, black mold)    I was 5 minutes late and patient was at the ,  almost taking out to leave the office, because she \"waited for too long\"  She says she was fasting for her blood work and she has to do it today. Finally she is agreeable to come back into the exam room. She did not follow with the GI specialist she says she does not want to do EGD and colonoscopy. She had anemia multiple times, I told her she has strong family history of cancers and if the blood work today shows again anemia, she should have the GI workup . She is reluctantly agreeable. However patient has housing issues which she thinks are more important at this time    Hypertension: Patient here for follow-up of elevated blood pressure. She is exercising, riding her bike and walking a lot, and is adherent to low salt diet. Blood pressure is well controlled at home. Cardiac symptoms fatigue. Patient denies chest pain, chest pressure/discomfort, claudication, dyspnea, exertional chest pressure/discomfort, irregular heart beat, lower extremity edema, near-syncope, orthopnea, palpitations, paroxysmal nocturnal dyspnea, syncope and tachypnea. Cardiovascular risk factors: hypertension and smoking/ tobacco exposure. Use of agents associated with hypertension: NSAIDS. History of target organ damage: none. Never completed the stress test  EKG on 2/24/2016 was normal     BP controlled. Don reports compliance with BP medications, and tolerates them well, denies side effects. BP Readings from Last 3 Encounters:   06/14/19 118/76   03/07/19 120/81   11/01/18 122/78      Pulse controlled.     Pulse Readings from Last 3 Encounters:   06/14/19 68   03/07/19 70 11/01/18 69     There is intentional and unintentional weight loss. She has lost 7 pounds in 3 months. She has been exercising a lot, but she was riding her bike even before. She finally admits she does not have an appetite and has not been eating lately and her bipolar is acting out  Wt Readings from Last 3 Encounters:   06/14/19 184 lb 9.6 oz (83.7 kg)   03/07/19 191 lb 12.8 oz (87 kg)   11/01/18 190 lb 9.6 oz (86.5 kg)       She has bipolar disorder. She finally admits that her bipolar has been acting out since having trouble with her apartment. She says she has mold in it, and black mold coming into her close. She says every morning she finds a spider bite on her face and she sees spiders. She wants to move out but nobody is helping her. She is is on disability    She admits that she did not go back to [facility] for a few months. She says she is \"taking the medications\", but she is agreeable for me to refill her medications. She finally says she might have ran out of some. She does not know the names of the medications but says she is on Prozac. She is not taking Lamictal for a while. She may be taking Seroquel but I do not have a dosage and she does not remember it, I advised her to call me. She says she does not hear any voices and does not see anything that other people do not see. (I'm not sure if the spiders are real or not!)     She says she has [facility] voucher and nobody  takes the vouchers to move into another apartment  Cannot find a place  She is agreeable for  referral    Denies suicidal ideation, plan or intent. She says she does not sleep well and she has poor appetite. She feels tired a lot, but cannot sleep.       Depression mild per PHQ-2    PHQ Scores 6/14/2019 3/7/2019 8/1/2018 2/6/2018 1/4/2018 2/22/2017   PHQ2 Score 2 2 3 4 3 4   PHQ9 Score 2 6 10 14 22 12     Interpretation of Total Score Depression Severity: 1-4 = Minimal depression, 5-9 = Mild depression, 10-14 = than 135/85, and above 110/60. 1 kit 0    albuterol (PROVENTIL) (2.5 MG/3ML) 0.083% nebulizer solution Take 3 mLs by nebulization every 6 hours as needed for Wheezing 120 each 3    cyclobenzaprine (FLEXERIL) 5 MG tablet Take 1 tablet by mouth nightly as needed for Muscle spasms 30 tablet 0    EPINEPHrine (EPIPEN 2-MAGDI) 0.3 MG/0.3ML SOAJ injection Inject 0.3 mLs into the muscle once as needed And call 911 1 each 3     No current facility-administered medications for this visit. Social History     Tobacco Use    Smoking status: Former Smoker     Packs/day: 1.50     Years: 18.00     Pack years: 27.00     Types: Cigarettes     Start date: 1996     Last attempt to quit: 10/9/2017     Years since quittin.6    Smokeless tobacco: Never Used    Tobacco comment: - PPD, quit smoking 2 weeks ago per my note from 10/23/17   Substance Use Topics    Alcohol use: No     Alcohol/week: 0.0 oz     Comment: quit smoking with patches    Drug use: Yes     Types: Marijuana       Counseling given: Yes  Comment: - PPD, quit smoking 2 weeks ago per my note from 10/23/17                    The patient's past medical, surgical, social, and family history as well as her current medications and allergies were reviewed as documented in today's encounter. Restof complaints with corresponding details per ROS    Review of Systems   Constitutional: Positive for appetite change, fatigue and unexpected weight change. Negative for activity change, chills, diaphoresis and fever. Respiratory: Negative for cough, chest tightness, shortness of breath and wheezing. Cardiovascular: Negative for chest pain, palpitations and leg swelling. Gastrointestinal: Negative for abdominal distention, abdominal pain, constipation, diarrhea, nausea and vomiting. Endocrine: Negative for cold intolerance, heat intolerance, polydipsia, polyphagia and polyuria.    Psychiatric/Behavioral: Positive for decreased concentration, dysphoric mood and sleep disturbance. Negative for hallucinations, self-injury and suicidal ideas. The patient is nervous/anxious.          -vital signs stable and within normal limits   /76   Pulse 68   Ht 5' 11\" (1.803 m)   Wt 184 lb 9.6 oz (83.7 kg)   LMP  (LMP Unknown)   SpO2 95%   BMI 25.75 kg/m²        Physical Exam   Constitutional: She is oriented to person, place, and time. She appears well-developed and well-nourished. No distress. HENT:   Head: Normocephalic and atraumatic. Right Ear: External ear normal.   Left Ear: External ear normal.   Nose: Nose normal.   Mouth/Throat: Oropharynx is clear and moist. No oropharyngeal exudate. Eyes: Conjunctivae and EOM are normal. Right eye exhibits no discharge. Left eye exhibits no discharge. No scleral icterus. Neck: Normal range of motion. Neck supple. No thyromegaly present. Cardiovascular: Normal rate, regular rhythm, normal heart sounds and intact distal pulses. Pulmonary/Chest: Effort normal and breath sounds normal. No respiratory distress. She has no wheezes. She has no rales. She exhibits no tenderness. Abdominal: Soft. Bowel sounds are normal. She exhibits no distension. There is no tenderness. Musculoskeletal: Normal range of motion. She exhibits no edema or tenderness. Neurological: She is alert and oriented to person, place, and time. No cranial nerve deficit. She exhibits normal muscle tone. Skin: Skin is warm and dry. Capillary refill takes less than 2 seconds. No rash noted. She is not diaphoretic. Psychiatric: Her behavior is normal. Judgment and thought content normal. Her mood appears anxious. Her affect is labile and inappropriate. Her speech is rapid and/or pressured. She exhibits a depressed mood. Patient came back to the room after I was late for 5 minutes, she is agreeable to work on the things with me.   She tells me she is fasting for the blood work which she wanted to do it today and she could not take it anymore and she got \"irritable\". She is agreeable for some sugar-free candy and water. She finally tells me about her housing issues, black mold and spiders, and unable to find a place to live. She finally tells me she does not follow with [facility] for a few months at least.   Nursing note and vitals reviewed. Discussed testing with the patient and all questions fully answered. I personally reviewed testing with patient.   Anemia  Vitamin D deficiency       Otherwise labs within normal limits    Hospital Outpatient Visit on 11/01/2018   Component Date Value Ref Range Status    WBC 11/01/2018 5.1  3.5 - 11.0 k/uL Final    RBC 11/01/2018 3.88* 4.0 - 5.2 m/uL Final    Hemoglobin 11/01/2018 11.3* 12.0 - 16.0 g/dL Final    Hematocrit 11/01/2018 34.5* 36 - 46 % Final    MCV 11/01/2018 89.0  80 - 100 fL Final    MCH 11/01/2018 29.1  26 - 34 pg Final    MCHC 11/01/2018 32.7  31 - 37 g/dL Final    RDW 11/01/2018 13.8  11.5 - 14.9 % Final    Platelets 48/37/7762 293  150 - 450 k/uL Final    MPV 11/01/2018 8.7  6.0 - 12.0 fL Final    NRBC Automated 11/01/2018 NOT REPORTED  per 100 WBC Final    Glucose 11/01/2018 81  70 - 99 mg/dL Final    BUN 11/01/2018 8  6 - 20 mg/dL Final    CREATININE 11/01/2018 0.75  0.50 - 0.90 mg/dL Final    Bun/Cre Ratio 11/01/2018 NOT REPORTED  9 - 20 Final    Calcium 11/01/2018 9.3  8.6 - 10.4 mg/dL Final    Sodium 11/01/2018 141  135 - 144 mmol/L Final    Potassium 11/01/2018 3.8  3.7 - 5.3 mmol/L Final    Chloride 11/01/2018 102  98 - 107 mmol/L Final    CO2 11/01/2018 30  20 - 31 mmol/L Final    Anion Gap 11/01/2018 9  9 - 17 mmol/L Final    Alkaline Phosphatase 11/01/2018 84  35 - 104 U/L Final    ALT 11/01/2018 16  5 - 33 U/L Final    AST 11/01/2018 24  <32 U/L Final    Total Bilirubin 11/01/2018 0.24* 0.3 - 1.2 mg/dL Final    Total Protein 11/01/2018 7.8  6.4 - 8.3 g/dL Final    Alb 11/01/2018 4.1  3.5 - 5.2 g/dL Final    Albumin/Globulin Ratio 11/01/2018 NOT REPORTED  1.0 - 2.5 Final    GFR Non- 11/01/2018 >60  >60 mL/min Final    GFR  11/01/2018 >60  >60 mL/min Final    GFR Comment 11/01/2018        Final    Comment: Average GFR for 38-51 years old:   80 mL/min/1.73sq m  Chronic Kidney Disease:   <60 mL/min/1.73sq m  Kidney failure:   <15 mL/min/1.73sq m              eGFR calculated using average adult body mass. Additional eGFR calculator   available at:        [email or web address]            GFR Staging 11/01/2018 NOT REPORTED   Final    Vit D, 25-Hydroxy 11/01/2018 22.6* 30.0 - 100.0 ng/mL Final    Comment:    Reference Range:  Vitamin D status         Range   Deficiency              <20 ng/mL   Mild Deficiency       20-30 ng/mL   Sufficiency           ng/mL   Toxicity               >100 ng/mL         Lab Results   Component Value Date    TSH 1.17 04/20/2018       Lab Results   Component Value Date    CHOL 172 05/09/2017    CHOL 148 03/22/2017     Lab Results   Component Value Date    TRIG 119 05/09/2017    TRIG 76 03/22/2017     Lab Results   Component Value Date    HDL 61 05/09/2017    HDL 48 03/22/2017     Lab Results   Component Value Date    LDLCHOLESTEROL 87 05/09/2017    LDLCHOLESTEROL 85 03/22/2017       Lab Results   Component Value Date    CHOLHDLRATIO 2.8 05/09/2017    CHOLHDLRATIO 3.1 03/22/2017         Lab Results   Component Value Date    [id number] 596 03/27/2017     Lab Results   Component Value Date    FOLATE 15.9 03/27/2017     ASSESSMENT AND PLAN      1. Essential hypertension  Well controlled  Discussed low salt diet and BP and pulse monitoring daily, BP log given  Continue current treatment. 2. Unintended weight loss  worsening  -Dose increased&refill   mirtazapine (REMERON) 30 MG tablet; Take 1 tablet by mouth nightly  Dispense: 30 tablet; Refill: 3  Await labs  We discussed GI workup if needed      3.  Bipolar depression ([address]  worsening  Restart meds  She agrees for referral to care coordination for social support, to help her with housing    - mirtazapine (REMERON) 30 MG tablet; Take 1 tablet by mouth nightly  Dispense: 30 tablet; Refill: 3  - FLUoxetine (PROZAC) 40 MG capsule; Take 1 capsule by mouth daily  Dispense: 30 capsule; Refill: 3  She will call me if she needs the Seroquel refilled and with the right dosage    I encouraged her to go back to [place]    4. Chronic fatigue  Failing to change as expected. Await labs  Restart meds for bipolar        Data Unavailable     Note sent to our Care coordinator, Abbie Mayers RN:   Patient needs referral to the  for housing. She has [employer] vouchers, cannot move to another apartment, has mold and spiders and it's making her bipolar worse, didn't go to [facility] in [facility], doesn't have  (she said)    No orders of the defined types were placed in this encounter. Medications Discontinued During This Encounter   Medication Reason    mirtazapine (REMERON) 15 MG tablet REORDER    cyclobenzaprine (FLEXERIL) 5 MG tablet Therapy completed    lamoTRIgine (LAMICTAL) 100 MG tablet DISCONTINUED BY ANOTHER CLINICIAN    amitriptyline (ELAVIL) 25 MG tablet Therapy completed       Don received counseling on the following healthy behaviors: nutrition, exercise, medication adherence and tobacco cessation  Reviewed prior labs and health maintenance  Continue current medications, diet and exercise. Discussed use, benefit, and side effects of prescribed medications. Barriers to medication compliance addressed. Patient given educational materials - see patient instructions  Was a self-tracking handout given in paper form or via [employer]?  Yes    Requested Prescriptions     Signed Prescriptions Disp Refills    mirtazapine (REMERON) 30 MG tablet 30 tablet 3     Sig: Take 1 tablet by mouth nightly    FLUoxetine (PROZAC) 40 MG capsule 30 capsule 3     Sig: Take 1 capsule by mouth daily       All patient questions answered. Patient voiced understanding. Quality Measures    Body mass index is 25.75 kg/m². Normal.  Healthy diet and regular exercise. BP: 118/76 Blood pressure is normal. Treatment plan consists of DASH Eating Plan, Dietary Sodium Restriction, Avoid Tobacco and Second-hand Smoke, Patient In-home Blood Pressure Monitoring and No treatment change needed. Lab Results   Component Value Date    LDLCHOLESTEROL 87 05/09/2017    (goal LDL reduction with dx if diabetes is 50% LDL reduction)      PHQ Scores 6/14/2019 3/7/2019 8/1/2018 2/6/2018 1/4/2018 2/22/2017   PHQ2 Score 2 2 3 4 3 4   PHQ9 Score 2 6 10 14 22 12     Interpretation of Total Score Depression Severity: 1-4 = Minimal depression, 5-9 = Mild depression, 10-14 = Moderate depression, 15-19 = Moderately severe depression, 20-27 = Severe depression      The patient's past medical, surgical, social, and family history as well as her   current medications and allergies were reviewed as documented in today's encounter. Medications, labs, diagnostic studies, consultations and follow-up as documented in this encounter. Return in about 3 months (around 9/14/2019) for LABS F/U, HTN. Patient was seen with total face to face time of  25 minutes. More than 50% of this visit was counseling and education. Future Appointments   Date Time Provider Candie Apolonia   9/17/2019 10:15 AM Elizabeth Mehta MD [id number]        This note was completed by using the assistance of a speech-recognition program. However, inadvertent computerized transcription errors may be present. Although every effort was made to ensure accuracy, no guarantees can be provided that every mistake has been identified and corrected by editing.     Electronically signed by Elizabeth Mehta MD on 6/15/2019 at 8:13 PM

## 2019-06-15 DIAGNOSIS — E55.9 VITAMIN D DEFICIENCY: Primary | ICD-10-CM

## 2019-06-15 PROBLEM — F33.41 RECURRENT MAJOR DEPRESSIVE DISORDER, IN PARTIAL REMISSION (HCC): Status: RESOLVED | Noted: 2018-01-04 | Resolved: 2019-06-15

## 2019-06-15 RX ORDER — ERGOCALCIFEROL 1.25 MG/1
50000 CAPSULE ORAL WEEKLY
Qty: 12 CAPSULE | Refills: 0 | Status: SHIPPED | OUTPATIENT
Start: 2019-06-15

## 2019-06-17 ENCOUNTER — TELEPHONE (OUTPATIENT)
Dept: FAMILY MEDICINE CLINIC | Age: 49
End: 2019-06-17

## 2019-06-17 NOTE — TELEPHONE ENCOUNTER
Micah Willoughby,   Please refer her to   CC chart note:  Routing Comments:   Patient needs referral to the  for housing. Adrianna Cruz has [employer] vouchers, cannot move to another apartment, has mold and spiders and it's making her bipolar worse, didn't go to [employer] in [place], doesn't have  (she said)

## 2019-06-19 ENCOUNTER — CARE COORDINATION (OUTPATIENT)
Dept: CARE COORDINATION | Age: 49
End: 2019-06-19

## 2019-06-19 NOTE — CARE COORDINATION
Phoned pt and the line was busy, not able to reach pt to discuss her social needs.   CHW's Plan of Care  Will contact pt again regarding social needs

## 2019-12-18 DIAGNOSIS — F31.9 BIPOLAR DEPRESSION (HCC): ICD-10-CM

## 2019-12-18 DIAGNOSIS — R63.4 UNINTENDED WEIGHT LOSS: ICD-10-CM

## 2019-12-18 RX ORDER — MIRTAZAPINE 30 MG/1
30 TABLET, FILM COATED ORAL NIGHTLY
Qty: 30 TABLET | Refills: 3 | Status: SHIPPED | OUTPATIENT
Start: 2019-12-18 | End: 2021-02-18 | Stop reason: SDUPTHER

## 2019-12-18 RX ORDER — FLUOXETINE HYDROCHLORIDE 40 MG/1
40 CAPSULE ORAL DAILY
Qty: 30 CAPSULE | Refills: 3 | Status: SHIPPED | OUTPATIENT
Start: 2019-12-18 | End: 2021-02-18 | Stop reason: SDUPTHER

## 2020-03-19 ENCOUNTER — TELEPHONE (OUTPATIENT)
Dept: FAMILY MEDICINE CLINIC | Age: 50
End: 2020-03-19

## 2020-03-24 RX ORDER — AMLODIPINE BESYLATE 5 MG/1
5 TABLET ORAL DAILY
Qty: 90 TABLET | Refills: 3 | Status: SHIPPED | OUTPATIENT
Start: 2020-03-24 | End: 2021-02-18 | Stop reason: SDUPTHER

## 2020-03-24 RX ORDER — FLUTICASONE PROPIONATE 220 UG/1
AEROSOL, METERED RESPIRATORY (INHALATION)
Qty: 12 G | Refills: 11 | Status: SHIPPED | OUTPATIENT
Start: 2020-03-24 | End: 2021-02-18 | Stop reason: SDUPTHER

## 2020-05-29 ENCOUNTER — OFFICE VISIT (OUTPATIENT)
Dept: FAMILY MEDICINE CLINIC | Age: 50
End: 2020-05-29
Payer: COMMERCIAL

## 2020-05-29 VITALS
OXYGEN SATURATION: 96 % | WEIGHT: 179 LBS | BODY MASS INDEX: 25.06 KG/M2 | HEART RATE: 66 BPM | HEIGHT: 71 IN | DIASTOLIC BLOOD PRESSURE: 74 MMHG | SYSTOLIC BLOOD PRESSURE: 116 MMHG

## 2020-05-29 PROCEDURE — 99214 OFFICE O/P EST MOD 30 MIN: CPT | Performed by: FAMILY MEDICINE

## 2020-05-29 PROCEDURE — G8420 CALC BMI NORM PARAMETERS: HCPCS | Performed by: FAMILY MEDICINE

## 2020-05-29 PROCEDURE — G8427 DOCREV CUR MEDS BY ELIG CLIN: HCPCS | Performed by: FAMILY MEDICINE

## 2020-05-29 PROCEDURE — 4004F PT TOBACCO SCREEN RCVD TLK: CPT | Performed by: FAMILY MEDICINE

## 2020-05-29 RX ORDER — NYSTATIN 100000 [USP'U]/G
POWDER TOPICAL
Qty: 60 G | Refills: 0 | Status: SHIPPED | OUTPATIENT
Start: 2020-05-29 | End: 2021-02-18 | Stop reason: ALTCHOICE

## 2020-05-29 RX ORDER — TERBINAFINE HYDROCHLORIDE 250 MG/1
250 TABLET ORAL DAILY
Qty: 84 TABLET | Refills: 0 | Status: SHIPPED | OUTPATIENT
Start: 2020-05-29 | End: 2020-08-21

## 2020-05-29 SDOH — ECONOMIC STABILITY: TRANSPORTATION INSECURITY
IN THE PAST 12 MONTHS, HAS THE LACK OF TRANSPORTATION KEPT YOU FROM MEDICAL APPOINTMENTS OR FROM GETTING MEDICATIONS?: NO

## 2020-05-29 SDOH — ECONOMIC STABILITY: FOOD INSECURITY: WITHIN THE PAST 12 MONTHS, THE FOOD YOU BOUGHT JUST DIDN'T LAST AND YOU DIDN'T HAVE MONEY TO GET MORE.: SOMETIMES TRUE

## 2020-05-29 SDOH — ECONOMIC STABILITY: FOOD INSECURITY: WITHIN THE PAST 12 MONTHS, YOU WORRIED THAT YOUR FOOD WOULD RUN OUT BEFORE YOU GOT MONEY TO BUY MORE.: SOMETIMES TRUE

## 2020-05-29 SDOH — ECONOMIC STABILITY: INCOME INSECURITY: HOW HARD IS IT FOR YOU TO PAY FOR THE VERY BASICS LIKE FOOD, HOUSING, MEDICAL CARE, AND HEATING?: SOMEWHAT HARD

## 2020-05-29 SDOH — ECONOMIC STABILITY: TRANSPORTATION INSECURITY
IN THE PAST 12 MONTHS, HAS LACK OF TRANSPORTATION KEPT YOU FROM MEETINGS, WORK, OR FROM GETTING THINGS NEEDED FOR DAILY LIVING?: NO

## 2020-05-29 ASSESSMENT — ENCOUNTER SYMPTOMS
CHEST TIGHTNESS: 0
ABDOMINAL PAIN: 0
VOMITING: 0
WHEEZING: 0
COUGH: 0
CONSTIPATION: 0
DIARRHEA: 0
NAUSEA: 0
ABDOMINAL DISTENTION: 0

## 2020-05-29 ASSESSMENT — PATIENT HEALTH QUESTIONNAIRE - PHQ9
1. LITTLE INTEREST OR PLEASURE IN DOING THINGS: 0
SUM OF ALL RESPONSES TO PHQ9 QUESTIONS 1 & 2: 0
SUM OF ALL RESPONSES TO PHQ QUESTIONS 1-9: 0
2. FEELING DOWN, DEPRESSED OR HOPELESS: 0
SUM OF ALL RESPONSES TO PHQ QUESTIONS 1-9: 0

## 2020-05-29 NOTE — PROGRESS NOTES
Chief Complaint   Patient presents with    Hypertension    Manic Behavior     Bipolar disorder follow-up    Foot Pain     Toenails hurting and dystrophic, big    Rash     Feet    Asthma       Here for chronic and new medical problems. Hypertension: Patient here for follow-up of elevated blood pressure. She is exercising and is adherent to low salt diet. Blood pressure is well controlled at home. Cardiac symptoms dyspnea and fatigue. Patient denies chest pain, chest pressure/discomfort, claudication, exertional chest pressure/discomfort, irregular heart beat, lower extremity edema, near-syncope, orthopnea, palpitations, paroxysmal nocturnal dyspnea, syncope and tachypnea. Cardiovascular risk factors: hypertension and smoking/ tobacco exposure. Use of agents associated with hypertension: NSAIDS. History of target organ damage: none. Never completed the stress test  EKG in 2016 was within normal limits   Echo 2D on 1/20/2016 was within normal limits    BP controlled. Don reports compliance with BP medications, and tolerates them well, denies side effects. BP Readings from Last 3 Encounters:   05/29/20 116/74   02/20/20 120/84   06/14/19 118/76          Pulse is Normal.    Pulse Readings from Last 3 Encounters:   05/29/20 66   02/20/20 63   06/14/19 68       Weight has been decreasing  Wt Readings from Last 3 Encounters:   05/29/20 179 lb (81.2 kg)   02/20/20 184 lb (83.5 kg)   06/14/19 184 lb 9.6 oz (83.7 kg)     Asthma:  Current treatment includes beta agonist inhalers, nebulized beta agonists, inhaled steroids, which has been effective. Using preventive medication(s) consistently: yes. Residual symptoms: dyspnea with intense exertion. Patient denies chest pain/tightness, cough, wheezing. She requires her rescue inhaler 1 time(s) per week. Received the Nebulizer and has been using it as needed      Nicotine dependence. Smoker, counseling given to quit smoking.   Ready to quit: MG tablet TAKE 1 TABLET BY MOUTH EVERY 6 HOURS AS NEEDED FOR PAIN 120 tablet 5    mirtazapine (REMERON) 30 MG tablet TAKE 1 TABLET BY MOUTH NIGHTLY 30 tablet 3    FLUoxetine (PROZAC) 40 MG capsule Take 1 capsule by mouth daily Interacts with ibuprofen, can increase the risk of GI ulcers and gastritis 30 capsule 3    vitamin D (ERGOCALCIFEROL) 64605 units CAPS capsule Take 1 capsule by mouth once a week 12 capsule 0    ketoconazole (NIZORAL) 2 % cream APPLY TOPICALLY TO FEET 2 TIMES A DAY 30 g 3    mupirocin (BACTROBAN) 2 % cream APPLY 3 TIMES DAILY X 7 DAYS 30 g 3    albuterol sulfate HFA (PROAIR HFA) 108 (90 Base) MCG/ACT inhaler Inhale 2 puffs into the lungs every 6 hours as needed for Wheezing or Shortness of Breath (cough) 8 g 3    nystatin (MYCOSTATIN) 291273 UNIT/GM powder Apply 2-3 times daily in between the toes x 3-4 mo 56.7 g 3    Lactobacillus (PROBIOTIC ACIDOPHILUS) TABS Take 1 tablet by mouth daily 90 tablet 3    ibuprofen (ADVIL;MOTRIN) 800 MG tablet Take 1 tablet by mouth every 8 hours as needed for Pain or Fever 15 tablet 0    omeprazole 20 MG EC tablet TAKE 1 TABLET BY MOUTH DAILY 30 tablet 3    Blood Pressure KIT Diagnosis: HTN. Needs to check blood pressure 1-2 times a day until stable, then once a day. Goal blood pressure less than 135/85, and above 110/60. 1 kit 0    albuterol (PROVENTIL) (2.5 MG/3ML) 0.083% nebulizer solution Take 3 mLs by nebulization every 6 hours as needed for Wheezing 120 each 3    EPINEPHrine (EPIPEN 2-MAGDI) 0.3 MG/0.3ML SOAJ injection Inject 0.3 mLs into the muscle once as needed And call 911 1 each 3     No current facility-administered medications for this visit.           Social History     Tobacco Use    Smoking status: Current Every Day Smoker     Packs/day: 0.50     Years: 18.00     Pack years: 9.00     Types: Cigarettes     Start date: 1996     Last attempt to quit: 10/9/2017     Years since quittin.6    Smokeless tobacco: Never Used   BridgeXs Fany moist.      Pharynx: No posterior oropharyngeal erythema. Eyes:      General: Lids are normal. No scleral icterus. Right eye: No discharge. Left eye: No discharge. Conjunctiva/sclera: Conjunctivae normal.   Neck:      Musculoskeletal: Normal range of motion and neck supple. Thyroid: No thyromegaly. Cardiovascular:      Rate and Rhythm: Normal rate and regular rhythm. Heart sounds: Normal heart sounds. No murmur. Pulmonary:      Effort: Pulmonary effort is normal. No respiratory distress. Breath sounds: Normal breath sounds. No wheezing or rales. Chest:      Chest wall: No tenderness. Abdominal:      General: Bowel sounds are normal. There is no distension. Palpations: Abdomen is soft. There is no hepatomegaly or splenomegaly. Tenderness: There is no abdominal tenderness. Musculoskeletal: Normal range of motion. General: No tenderness. Right lower leg: No edema. Left lower leg: No edema. Skin:     General: Skin is warm and dry. Capillary Refill: Capillary refill takes less than 2 seconds. Findings: Rash present. Comments: Bilateral feet with extensive tinea pedis affecting the soles of the feet and between the toes. The first 3 toenails are greatly dystrophic, worse in the big great toenails bilaterally, affecting the whole toenails. There is no paronychia    Neurological:      Mental Status: She is alert and oriented to person, place, and time. Cranial Nerves: No cranial nerve deficit. Motor: No abnormal muscle tone. Psychiatric:         Mood and Affect: Mood normal.         Behavior: Behavior normal.         Thought Content: Thought content normal.         Judgment: Judgment normal.         1. Essential hypertension  Well controlled. Continue current treatment. Will recheck labs. - CBC; Future  - Comprehensive Metabolic Panel; Future  - TSH without Reflex;  Future  Discussed low salt diet and BP and pulse Italypablo Demarco miconazole (MICOTIN) 2 % cream 1 Tube 3     Sig: Apply topically 2 times daily on the bottom of the feet but not between the toes    terbinafine (LAMISIL) 250 MG tablet 84 tablet 0     Sig: Take 1 tablet by mouth daily       All patient questions answered. Patient voiced understanding. Quality Measures    Body mass index is 24.97 kg/m². Normal. Weight control planned discussed Healthy diet and regular exercise. BP: 116/74 Blood pressure is normal. Treatment plan consists of DASH Eating Plan, Dietary Sodium Restriction, Increased Physical Activity, Avoid Tobacco and Second-hand Smoke, Patient In-home Blood Pressure Monitoring and No treatment change needed. The patient's past medical, surgical, social, and family history as well as her   current medications and allergies were reviewed as documented in today's encounter. Medications, labs, diagnostic studies, consultations and follow-up as documented in this encounter. Return in about 3 months (around 8/29/2020) for Face-2F-30mins PHYSICAL, VISION screen, PHQ9. .    Patient was seen with total face to face time of  25 minutes. More than 50% of this visit was counseling and education. Future Appointments   Date Time Provider [place] [place]   6/4/2020  1:15 PM Apoorva Richey DPM [facility]   9/4/2020  1:00 PM Shane Sibley MD [facility]        This note was completed by using the assistance of a speech-recognition program. However, inadvertent computerized transcription errors may be present. Although every effort was made to ensure accuracy, no guarantees can be provided that every mistake has been identified and corrected by editing.     Electronically signed by Shane Sibley MD on 5/31/2020 at 5:06 PM

## 2020-05-29 NOTE — PROGRESS NOTES
Visit Information    Have you changed or started any medications since your last visit including any over-the-counter medicines, vitamins, or herbal medicines? no   Are you having any side effects from any of your medications? -  no  Have you stopped taking any of your medications? Is so, why? -  no    Have you seen any other physician or provider since your last visit? No  Have you had any other diagnostic tests since your last visit? No  Have you been seen in the emergency room and/or had an admission to a hospital since we last saw you? No  Have you had your routine dental cleaning in the past 6 months? no    Have you activated your [employer] account? If not, what are your barriers?  No:      Patient Care Team:  Leonor Escobar MD as PCP - General (Family Medicine)  Leonor Escobar MD as PCP - [facility]  Jayesh Helton MD as Consulting Physician (Hematology and Oncology)  Roland Carranza MD as Consulting Physician (Gastroenterology)    Medical History Review  Past Medical, Family, and Social History reviewed and does contribute to the patient presenting condition    Health Maintenance   Topic Date Due    Breast cancer screen  01/19/2020    Potassium monitoring  06/14/2020    Creatinine monitoring  06/14/2020    Flu vaccine (Season Ended) 09/01/2020    Lipid screen  06/14/2024    DTaP/Tdap/Td vaccine (2 - Td) 04/20/2028    Pneumococcal 0-64 years Vaccine  Completed    HIV screen  Completed    Hepatitis A vaccine  Aged Out    Hepatitis B vaccine  Aged Out    Hib vaccine  Aged Out    Meningococcal (ACWY) vaccine  Aged Out

## 2020-05-31 PROBLEM — B35.1 PAIN DUE TO ONYCHOMYCOSIS OF TOENAIL: Status: ACTIVE | Noted: 2020-05-31

## 2020-05-31 PROBLEM — M79.676 PAIN DUE TO ONYCHOMYCOSIS OF TOENAIL: Status: ACTIVE | Noted: 2020-05-31

## 2020-05-31 ASSESSMENT — ENCOUNTER SYMPTOMS
SHORTNESS OF BREATH: 1
BLOOD IN STOOL: 0

## 2021-02-17 ASSESSMENT — PATIENT HEALTH QUESTIONNAIRE - PHQ9
10. IF YOU CHECKED OFF ANY PROBLEMS, HOW DIFFICULT HAVE THESE PROBLEMS MADE IT FOR YOU TO DO YOUR WORK, TAKE CARE OF THINGS AT HOME, OR GET ALONG WITH OTHER PEOPLE: 2
5. POOR APPETITE OR OVEREATING: 3
SUM OF ALL RESPONSES TO PHQ QUESTIONS 1-9: 22
9. THOUGHTS THAT YOU WOULD BE BETTER OFF DEAD, OR OF HURTING YOURSELF: 1
4. FEELING TIRED OR HAVING LITTLE ENERGY: 3
8. MOVING OR SPEAKING SO SLOWLY THAT OTHER PEOPLE COULD HAVE NOTICED. OR THE OPPOSITE, BEING SO FIGETY OR RESTLESS THAT YOU HAVE BEEN MOVING AROUND A LOT MORE THAN USUAL: 3
SUM OF ALL RESPONSES TO PHQ QUESTIONS 1-9: 23
SUM OF ALL RESPONSES TO PHQ9 QUESTIONS 1 & 2: 4

## 2021-02-17 ASSESSMENT — COLUMBIA-SUICIDE SEVERITY RATING SCALE - C-SSRS
6. HAVE YOU EVER DONE ANYTHING, STARTED TO DO ANYTHING, OR PREPARED TO DO ANYTHING TO END YOUR LIFE?: NO
1. WITHIN THE PAST MONTH, HAVE YOU WISHED YOU WERE DEAD OR WISHED YOU COULD GO TO SLEEP AND NOT WAKE UP?: YES
3. HAVE YOU BEEN THINKING ABOUT HOW YOU MIGHT KILL YOURSELF?: NO
2. HAVE YOU ACTUALLY HAD ANY THOUGHTS OF KILLING YOURSELF?: YES
5. HAVE YOU STARTED TO WORK OUT OR WORKED OUT THE DETAILS OF HOW TO KILL YOURSELF? DO YOU INTEND TO CARRY OUT THIS PLAN?: NO

## 2021-02-17 NOTE — PROGRESS NOTES
Visit Information    Have you changed or started any medications since your last visit including any over-the-counter medicines, vitamins, or herbal medicines? no   Have you stopped taking any of your medications? Is so, why? - YES  Are you having any side effe nocts from any of your medications? - yes - STOMACH ISSUES/ACID REFLUX DISEASE    Have you seen any other physician or provider since your last visit?  no   Have you had any other diagnostic tests since your last visit?  no   Have you been seen in the emergency room and/or had an admission in a hospital since we last saw you?  no   Have you had your routine dental cleaning in the past 6 months?  no     Do you have an active MyChart account? If no, what is the barrier?   Yes    Patient Care Team:  Juli Swan MD as PCP - General (Family Medicine)  Juli Swan MD as PCP - [facility] Provider  Oneil Fabian MD as Consulting Physician (Hematology and Oncology)  Jackson Prince MD as Consulting Physician (Gastroenterology)    Medical History Review  Past Medical, Family, and Social History reviewed and does contribute to the patient presenting condition    Health Maintenance   Topic Date Due    Hepatitis C screen  1970    Potassium monitoring  06/14/2020    Creatinine monitoring  06/14/2020    Breast cancer screen  07/20/2020    Shingles Vaccine (1 of 2) 07/20/2020    Colon cancer screen colonoscopy  07/20/2020    Flu vaccine (1) 09/01/2020    Lipid screen  06/14/2024    DTaP/Tdap/Td vaccine (2 - Td) 04/20/2028    Pneumococcal 0-64 years Vaccine  Completed    HIV screen  Completed    Hepatitis A vaccine  Aged Out    Hepatitis B vaccine  Aged Out    Hib vaccine  Aged Out    Meningococcal (ACWY) vaccine  Aged Out

## 2021-02-18 ENCOUNTER — VIRTUAL VISIT (OUTPATIENT)
Dept: FAMILY MEDICINE CLINIC | Age: 51
End: 2021-02-18
Payer: COMMERCIAL

## 2021-02-18 ENCOUNTER — TELEPHONE (OUTPATIENT)
Dept: FAMILY MEDICINE CLINIC | Age: 51
End: 2021-02-18

## 2021-02-18 DIAGNOSIS — Z12.11 COLON CANCER SCREENING: ICD-10-CM

## 2021-02-18 DIAGNOSIS — Z11.59 ENCOUNTER FOR SCREENING FOR OTHER VIRAL DISEASES: ICD-10-CM

## 2021-02-18 DIAGNOSIS — Z13.6 SCREENING FOR CARDIOVASCULAR CONDITION: ICD-10-CM

## 2021-02-18 DIAGNOSIS — N94.10 DYSPAREUNIA, FEMALE: Primary | ICD-10-CM

## 2021-02-18 DIAGNOSIS — I10 ESSENTIAL HYPERTENSION: ICD-10-CM

## 2021-02-18 DIAGNOSIS — J45.40 MODERATE PERSISTENT ASTHMA WITHOUT COMPLICATION: ICD-10-CM

## 2021-02-18 DIAGNOSIS — Z12.31 ENCOUNTER FOR SCREENING MAMMOGRAM FOR BREAST CANCER: ICD-10-CM

## 2021-02-18 DIAGNOSIS — F31.9 BIPOLAR DEPRESSION (HCC): ICD-10-CM

## 2021-02-18 PROCEDURE — 99214 OFFICE O/P EST MOD 30 MIN: CPT | Performed by: FAMILY MEDICINE

## 2021-02-18 RX ORDER — ALBUTEROL SULFATE 90 UG/1
2 AEROSOL, METERED RESPIRATORY (INHALATION) EVERY 6 HOURS PRN
Qty: 8 G | Refills: 3 | Status: SHIPPED | OUTPATIENT
Start: 2021-02-18

## 2021-02-18 RX ORDER — MIRTAZAPINE 30 MG/1
30 TABLET, FILM COATED ORAL NIGHTLY
Qty: 90 TABLET | Refills: 0 | Status: SHIPPED | OUTPATIENT
Start: 2021-02-18 | End: 2021-05-06

## 2021-02-18 RX ORDER — AMLODIPINE BESYLATE 5 MG/1
5 TABLET ORAL DAILY
Qty: 90 TABLET | Refills: 3 | Status: SHIPPED | OUTPATIENT
Start: 2021-02-18 | End: 2021-05-20 | Stop reason: SDUPTHER

## 2021-02-18 RX ORDER — FLUTICASONE PROPIONATE 220 UG/1
2 AEROSOL, METERED RESPIRATORY (INHALATION) 2 TIMES DAILY
Qty: 12 G | Refills: 11 | Status: SHIPPED | OUTPATIENT
Start: 2021-02-18

## 2021-02-18 RX ORDER — FLUOXETINE HYDROCHLORIDE 40 MG/1
40 CAPSULE ORAL DAILY
Qty: 90 CAPSULE | Refills: 0 | Status: SHIPPED | OUTPATIENT
Start: 2021-02-18 | End: 2021-05-06

## 2021-02-18 RX ORDER — MULTIVIT-MIN/IRON FUM/FOLIC AC 7.5 MG-4
1 TABLET ORAL DAILY
Qty: 90 TABLET | Refills: 3 | Status: SHIPPED | OUTPATIENT
Start: 2021-02-18 | End: 2021-02-19 | Stop reason: ALTCHOICE

## 2021-02-18 RX ORDER — ACETAMINOPHEN 500 MG
TABLET ORAL
Qty: 120 TABLET | Refills: 5 | Status: SHIPPED | OUTPATIENT
Start: 2021-02-18

## 2021-02-18 NOTE — PROGRESS NOTES
Don contacted provider via telephone    Patient requested office visit, was scheduled for virtual visit phone call , Micaela Alva was unable to use doxy. me or MyChart. Micaela Alva is a 48 y.o. female evaluated via telephone on  21    Don Pimentel (:  1970) is a 48 y.o. female,Established patient, here for evaluation of the following chief complaint(s): Depression, Menopause (HAVING A LOT OF PAIN DURING SEX FROM HYSTERECTOMY/HAS QUESTIONS/BECOMES DRY ), Other (NO TO FLU VACCINES), Asthma, and Hypertension      ASSESSMENT/PLAN:    1. Dyspareunia, female  Worsening  Finally reluctantly she is agreeable for GYN in her area and to try another lubricant, I explained to her I cannot give her estrogen because of the high risk of blood clots, she has history of DVT    -     Vaginal Lubricant (VAGISIL LUBRICANT) GEL; Apply at nighttime every evening , long term, Disp-1 each, R-5Normal  -     [facility] CNP, OB/GYN, [place]  2. Moderate persistent asthma without complication  Stable  Continue current treatment  -     albuterol sulfate HFA (PROAIR HFA) 108 (90 Base) MCG/ACT inhaler; Inhale 2 puffs into the lungs every 6 hours as needed for Wheezing or Shortness of Breath (cough), Disp-8 g, R-3Normal  -     fluticasone (FLOVENT HFA) 220 MCG/ACT inhaler; Inhale 2 puffs into the lungs 2 times daily Rinse mouth after using, Disp-12 g, R-11Normal  3. Bipolar depression ([address]  Worsening  Restart psychiatric medications  She declines psychological referral or psychiatrist referral in [facility], she declines going back to [facility] or to any other place at this time  -     Vitamin D 25 Hydroxy; Future  -   restart  FLUoxetine (PROZAC) 40 MG capsule; Take 1 capsule by mouth daily Interacts with ibuprofen, can increase the risk of GI ulcers and gastritis, Disp-90 capsule, R-0Normal  -     Multiple Vitamins-Minerals (MULTIVITAMIN WITH MINERALS) tablet;  Take 1 tablet by mouth daily, Disp-90 tablet, R-3Normal -    restart mirtazapine (REMERON) 30 MG tablet; Take 1 tablet by mouth nightly, Disp-90 tablet, R-0Normal  4. Essential hypertension  Likely well controlled based on prior blood pressure readings  Recheck labs  Discussed low salt diet and BP and pulse monitoring daily  Refills given  BP Readings from Last 3 Encounters:   05/29/20 116/74   02/20/20 120/84   06/14/19 118/76       -     CBC; Future  -     Comprehensive Metabolic Panel; Future  -     TSH without Reflex; Future  -     amLODIPine (NORVASC) 5 MG tablet; Take 1 tablet by mouth daily, Disp-90 tablet, R-3Normal  5. Encounter for screening mammogram for breast cancer  -     KEVON DIGITAL SCREEN W OR WO CAD BILATERAL; Future  6. Encounter for screening for other viral diseases  -     Hepatitis C Antibody; Future  7. Colon cancer screening  -     Cologuard; Future  8. Screening for cardiovascular condition  -     Lipid Panel; Future      Patient referred to our care coordinator Care coordinator, Svitlana Copeland RN due to transportation issues  \"Patient has difficulty with transportation, worsening bipolar, ran out of medications. I refilled everything.,  Refused psychiatrist and psychologist. If you can help her with transportation to get her blood work done and the testing done I would appreciate. Thank you! \"      Patient absolutely refused referral to psychiatrist or psychologist, patient says it \"aggravates\" her. Patient does not want to [facility] to [facility], she says they were racist, talked about her, and made fun of her feet. I tried to explain her, but she disagrees  I explained about the Cologuard which will be mailed to her house      Don received counseling on the following healthy behaviors: nutrition, exercise, medication adherence and tobacco cessation  Reviewed prior labs and health maintenance  Discussed use, benefit, and side effects of prescribed medications. Barriers to medication compliance addressed. Patient given educational materials - see patient instructions  Was a self-tracking handout given in paper form or via [employer]? Yes  All patient questions answered. Patient voiced understanding. The patient's past medical,surgical, social, and family history as well as her current medications and allergies were reviewed as documented in today's encounter. Medications, labs, diagnostic studies, consultations and follow-up as documented in this encounter. Return in about 4 weeks (around 3/18/2021) for depression-DO PHQ-9 in EPIC, LABS F/U, cologuard and mammogram ordered. Data Unavailable      Consent:  She is aware that that she may receive a bill for this telephone service, depending on her insurance coverage, and has provided verbal consent to proceed: Yes      Documentation and HPI:  I communicated with the patient about: Depression, Menopause (HAVING A LOT OF PAIN DURING SEX FROM HYSTERECTOMY/HAS QUESTIONS/BECOMES DRY ), Other (NO TO FLU VACCINES), Asthma, and Hypertension       Don reports:  \"it is very painful to have sex\",\" I feel dry all the time\"  She says that she did try K Y jelly but did not help. \"I don't want pap smear because it hurt a lot last time \"  Denies discharge  Patient says she cannot go anywhere, \"We cannot get medical cab or any kind of cab\"  Had hysterectomy in 2001  Finally reluctantly she is agreeable for GYN in her area and to try another lubricant, I explained to her I cannot give her estrogen because of the high risk of blood clots, she has history of DVT    Asthma:  Current treatment includes beta agonist inhalers, nebulized beta agonists, inhaled steroids, which has been effective. Using preventive medication(s) consistently: yes. Residual symptoms: none. Patient denies chest pain/tightness, dyspnea, dyspnea on exertion, cough, wheezing. She requires her rescue inhaler 1 time(s) per week. Nicotine dependence. Smoker, counseling given to quit smoking. Ready to quit: No  Counseling given: Yes  Comment: 1/2-1 PPD, quit smoking 2 weeks ago per my note from 10/23/17        Bipolar depression  Patient used to go to [facility], but not going anymore  Doesn't want to go to [facility] either  I don't want to talk with anyone  They ask me \"how do you feel today\" and \"it aggravates me\"  Ran out of Prozac and mirtazapine   \"I have been out of meds except BP meds\"  \"I'm on disability, I cannot afford anything\". She is frustrated that the Tylenol and multivitamins were not refilled I will refill them  Denies current suicidal ideation, plan or intent. Denies hallucinations      PHQ-2 Over the past 2 weeks, how often have you been bothered by any of the following problems? Little interest or pleasure in doing things: Several days  Feeling down, depressed, or hopeless: Nearly every day  PHQ-2 Score: 4  PHQ-9 Over the past 2 weeks, how often have you been bothered by any of the following problems? Trouble falling or staying asleep, or sleeping too much: Nearly every day  Feeling tired or having little energy: Nearly every day  Poor appetite or overeating: Nearly every day  Feeling bad about yourself - or that you are a failure or have let yourself or your family down: Nearly every day  Trouble concentrating on things, such as reading the newspaper or watching television: Nearly every day  Moving or speaking so slowly that other people could have noticed.  Or the opposite - being so fidgety or restless that you have been moving around a lot more than usual: Nearly every day  Thoughts that you would be better off dead, or of hurting yourself in some way: Several days  If you checked off any problems, how difficult have these problems made it for you to do your work, take care of things at home, or get along with other people?: Very difficult  PHQ-9 Total Score: 23      AMB C-SSRS Suicide Screening 1) Within the past month, have you wished you were dead or wished you could go to sleep and not wake up? YES   2) Have you actually had any thoughts of killing yourself? YES   3) Have you been thinking about how you might kill yourself? NO   4) Have you had these thoughts and had some intention of acting on them? NO   5) Have you started to work out or worked out the details of how to kill yourself? Do you intend to carry out this plan? NO   6) Have you ever done anything, started to do anything, or prepared to do anything to end your life? NO     Severe depression    PHQ Scores 2/17/2021 5/29/2020 2/20/2020 6/14/2019 3/7/2019 8/1/2018 2/6/2018   PHQ2 Score 4 0 2 2 2 3 4   PHQ9 Score 23 0 2 2 6 10 14         Hypertension:    she  is not exercising and is not adherent to low salt diet. Does not check blood pressure at home   Cardiac symptoms fatigue. Patient denies chest pain, chest pressure/discomfort, claudication, dyspnea, exertional chest pressure/discomfort, irregular heart beat, lower extremity edema, near-syncope, orthopnea, palpitations, paroxysmal nocturnal dyspnea, syncope and tachypnea. Cardiovascular risk factors: hypertension and smoking/ tobacco exposure. Use of agents associated with hypertension: none. History of target organ damage: none. blood pressure is Normal .  In the past    BP Readings from Last 3 Encounters:   05/29/20 116/74   02/20/20 120/84   06/14/19 118/76        She is due for Mammogram.   Denies breast pain, lumps or nipple discharge. She declines breast exam today. Patient is due for hepatitis C screening. Patrica Mosher 's indication is age. Patient is due for colon cancer screening. Don denies  nausea, vomiting, diarrhea, constipation, blood in the stool or abdominal pain. We discussed options, she would like to have: Cologuard    Due for lipids screening. Due to age and HTN, Patrica Mosher is due for lipids screening. Don is not eating low fat diet. she is not exercising. she is not taking any over the counter supplements. The patient's past medical, surgical, social, and family history as well as her current medications and allergies were reviewed as documented in today's encounter. Prior to Visit Medications    Medication Sig Taking? Authorizing Provider   amLODIPine (NORVASC) 5 MG tablet TAKE 1 TABLET BY MOUTH DAILY Yes Arvind Mir MD   FLOVENT  MCG/ACT inhaler INHALE 2 PUFFS INTO THE LUNGS 2 TIMES DAILY Yes Arvind Mir MD   acetaminophen (HM PAIN RELIEF EXTRA STRENGTH) 500 MG tablet TAKE 1 TABLET BY MOUTH EVERY 6 HOURS AS NEEDED FOR PAIN Yes Arvind Mir MD   mirtazapine (REMERON) 30 MG tablet TAKE 1 TABLET BY MOUTH NIGHTLY Yes Arvind Mir MD   vitamin D (ERGOCALCIFEROL) 42253 units CAPS capsule Take 1 capsule by mouth once a week Yes Arvind Mir MD   mupirocin (BACTROBAN) 2 % cream APPLY 3 TIMES DAILY X 7 DAYS Yes Arvind Mir MD   albuterol sulfate HFA (PROAIR HFA) 108 (90 Base) MCG/ACT inhaler Inhale 2 puffs into the lungs every 6 hours as needed for Wheezing or Shortness of Breath (cough) Yes Arvind Mir MD   Blood Pressure KIT Diagnosis: HTN. Needs to check blood pressure 1-2 times a day until stable, then once a day. Goal blood pressure less than 135/85, and above 110/60. Yes Arvind Mir MD   albuterol (PROVENTIL) (2.5 MG/3ML) 0.083% nebulizer solution Take 3 mLs by nebulization every 6 hours as needed for Wheezing Yes Yulia Garcia MD   nystatin (MYCOSTATIN) 502945 UNIT/GM powder Apply 1-2 times daily between the toes .   Arvind Mir MD   miconazole (MICOTIN) 2 % cream Apply topically 2 times daily on the bottom of the feet but not between the toes  Patient not taking: Reported on 2/17/2021  Arvind Mir MD   Pediatric Multivitamins-Iron (FLINTSTONES PLUS IRON) CHEW Take 1 tablet by mouth daily Patient not taking: Reported on 2/17/2021  Woo Nolan MD   FLUoxetine (PROZAC) 40 MG capsule Take 1 capsule by mouth daily Interacts with ibuprofen, can increase the risk of GI ulcers and gastritis  Patient not taking: Reported on 2/17/2021  Woo Nolan MD   omeprazole 20 MG EC tablet TAKE 1 TABLET BY MOUTH DAILY  Patient not taking: Reported on 2/17/2021  Martha Salcedo MD   EPINEPHrine (EPIPEN 2-MAGDI) 0.3 MG/0.3ML SOAJ injection Inject 0.3 mLs into the muscle once as needed And call Lillie Willard MD       -vital signs stable and within normal limits  Patient-Reported Vitals 2/17/2021   Patient-Reported Weight 196 LB   Patient-Reported Height 5 11      BP Readings from Last 3 Encounters:   05/29/20 116/74   02/20/20 120/84   06/14/19 118/76         Orders Placed This Encounter   Medications    Vaginal Lubricant (VAGISIL LUBRICANT) GEL     Sig: Apply at nighttime every evening , long term     Dispense:  1 each     Refill:  5    FLUoxetine (PROZAC) 40 MG capsule     Sig: Take 1 capsule by mouth daily Interacts with ibuprofen, can increase the risk of GI ulcers and gastritis     Dispense:  90 capsule     Refill:  0    Multiple Vitamins-Minerals (MULTIVITAMIN WITH MINERALS) tablet     Sig: Take 1 tablet by mouth daily     Dispense:  90 tablet     Refill:  3    albuterol sulfate HFA (PROAIR HFA) 108 (90 Base) MCG/ACT inhaler     Sig: Inhale 2 puffs into the lungs every 6 hours as needed for Wheezing or Shortness of Breath (cough)     Dispense:  8 g     Refill:  3    mirtazapine (REMERON) 30 MG tablet     Sig: Take 1 tablet by mouth nightly     Dispense:  90 tablet     Refill:  0    acetaminophen (HM PAIN RELIEF EXTRA STRENGTH) 500 MG tablet     Sig: TAKE 1 TABLET BY MOUTH EVERY 6 HOURS AS NEEDED FOR PAIN     Dispense:  120 tablet     Refill:  5    fluticasone (FLOVENT HFA) 220 MCG/ACT inhaler     Sig: Inhale 2 puffs into the lungs 2 times daily Rinse mouth after using Dispense:  12 g     Refill:  11    amLODIPine (NORVASC) 5 MG tablet     Sig: Take 1 tablet by mouth daily     Dispense:  90 tablet     Refill:  3       Orders Placed This Encounter   Procedures    Cologangelita     This test is performed by an external laboratory and is used for result attachment only. It is required that this order requisition be faxed to: [facility] @@ [phone]. See [email or web address] for further information.      Standing Status:   Future     Standing Expiration Date:   12/18/2021    KEVON DIGITAL SCREEN W OR WO CAD BILATERAL     Standing Status:   Future     Standing Expiration Date:   6/18/2021     Order Specific Question:   Reason for exam:     Answer:   screening mammogram    Hepatitis C Antibody     Standing Status:   Future     Standing Expiration Date:   12/18/2021    CBC     Standing Status:   Future     Standing Expiration Date:   12/18/2021    Comprehensive Metabolic Panel     Standing Status:   Future     Standing Expiration Date:   12/18/2021    TSH without Reflex     Standing Status:   Future     Standing Expiration Date:   12/18/2021    Lipid Panel     Standing Status:   Future     Standing Expiration Date:   12/18/2021     Order Specific Question:   Is Patient Fasting?/# of Hours     Answer:   8-10 Hours, water ok to drink    Vitamin D 25 Hydroxy     Standing Status:   Future     Standing Expiration Date:   12/18/2021   Oswaldo Corrales [facility] [facility], OB/GYN, [place]     Referral Priority:   Routine     Referral Type:   Eval and Treat     Referral Reason:   Specialty Services Required     Referred to Provider:   PHIL Abraham CNP     Requested Specialty:   Nurse Practitioner Family     Number of Visits Requested:   1       Medications Discontinued During This Encounter   Medication Reason    ibuprofen (ADVIL;MOTRIN) 800 MG tablet Side effects    Lactobacillus (PROBIOTIC ACIDOPHILUS) TABS Side effects  Pediatric Multivitamins-Iron (FLINTSTONES PLUS IRON) CHEW Patient Choice    miconazole (MICOTIN) 2 % cream Therapy completed    omeprazole 20 MG EC tablet Patient Choice    nystatin (MYCOSTATIN) 496646 UNIT/GM powder Therapy completed    albuterol sulfate HFA (PROAIR HFA) 108 (90 Base) MCG/ACT inhaler REORDER    mirtazapine (REMERON) 30 MG tablet REORDER    FLUoxetine (PROZAC) 40 MG capsule REORDER    acetaminophen (HM PAIN RELIEF EXTRA STRENGTH) 500 MG tablet REORDER    amLODIPine (NORVASC) 5 MG tablet REORDER    FLOVENT  MCG/ACT inhaler REORDER       I affirm this is a Patient Initiated Episode with an Established Patient who has not had a related appointment within my department in the past 7 days or scheduled within the next 24 hours. Patient location's was at home, and provider's location was at the primary practice location. Future Appointments   Date Time Provider Candie Barksdale   5/20/2021  3:45 PM Mary Ellen Gerardo MD [facility]        Patient identification was verified at the start of the visit: Yes    On this date 02/18/21 I have spent  30 minutes reviewing previous notes, test results and face to face with the patient discussing the diagnosis and importance of compliance with the treatment plan as well as documenting on the day of the visit. An electronic signature was used to authenticate this note.   Electronically signed by Mary Ellen Gerardo MD on 2/18/2021  at 6:29 PM

## 2021-02-18 NOTE — PATIENT INSTRUCTIONS
· Continue to take your medicine after your symptoms improve. Taking your medicine for at least 6 months after you feel better can help keep you from getting depressed again. If your depression keeps coming back, your doctor may recommend you take medicine even longer. · Continue counseling. It may help prevent depression from returning, especially if you've had multiple episodes of depression. Talk with your counselor if you are having a hard time attending your sessions or you think the sessions aren't working. Don't just stop going. · Eat healthy foods. Include fruits, vegetables, beans, and whole grains in your diet each day. · Get regular exercise. Go for a walk or jog, ride your bike, or play sports with friends. · See your doctor right away if you have new symptoms or feel that your depression is coming back. · Keep a regular sleep schedule. Try for 8 hours of sleep a night. · Avoid using illegal drugs or marijuana and drinking alcohol. · If you or someone you know talks about suicide, self-harm, or feeling hopeless, get help right away. Call the [address] [place] [address] at 1-800-273-talk (5-743.609.2809) or text HOME to 688915 to access the Crisis Text Line. Consider saving these numbers in your phone. When should you call for help? Call 276 anytime you think you may need emergency care. For example, call if:    · You are thinking about suicide or are threatening suicide.     · You feel you cannot stop from hurting yourself or someone else.     · You hear or see things that aren't real.     · You think or speak in a bizarre way that is not like your usual behavior. Call your doctor now or seek immediate medical care if:    · You are drinking a lot of alcohol or using illegal drugs.     · You are talking or writing about death.    Watch closely for changes in your health, and be sure to contact your doctor if:   · You find it hard or it's getting harder to deal with school, a job, family, or friends.     · You think your treatment is not helping or you are not getting better.     · Your symptoms get worse or you get new symptoms.     · You have any problems with your antidepressant medicines, such as side effects, or you are thinking about stopping your medicine.     · You are having manic behavior, such as having very high energy, needing less sleep than normal, or showing risky behavior such as spending money you don't have or abusing others verbally or physically. Where can you learn more? Go to https://[employer]. org and sign in to your [employer] account. Enter X594 in the [employer] box to learn more about \"Preventing Depression From Coming Back: Care Instructions. \"     If you do not have an account, please click on the \"Sign Up Now\" link. Current as of: January 31, 2020               Content Version: 12.6  © 7756-5701 [employer]. Care instructions adapted under license by [facility] ([facility]). If you have questions about a medical condition or this instruction, always ask your healthcare professional. [place] [address] any warranty or liability for your use of this information.

## 2021-02-18 NOTE — Clinical Note
Patient has difficulty with transportation, worsening bipolar, ran out of medications. I refilled everything.,  Refused psychiatrist and psychologist. If you can help her with transportation to get her blood work done and the testing done I would appreciate. Thank you!

## 2021-02-18 NOTE — TELEPHONE ENCOUNTER
Patient referred to care coordination due to social issues please let Mike Dias know    Patient referred to our care coordinator Care coordinator, Wing Corbett, RN due to transportation issues  \"Patient has difficulty with transportation, worsening bipolar, ran out of medications. I refilled everything.,  Refused psychiatrist and psychologist. If you can help her with transportation to get her blood work done and the testing done I would appreciate. Thank you! \"

## 2021-02-19 ENCOUNTER — CARE COORDINATION (OUTPATIENT)
Dept: CARE COORDINATION | Age: 51
End: 2021-02-19

## 2021-02-19 ENCOUNTER — TELEPHONE (OUTPATIENT)
Dept: FAMILY MEDICINE CLINIC | Age: 51
End: 2021-02-19

## 2021-02-19 DIAGNOSIS — F31.9 BIPOLAR DEPRESSION (HCC): Primary | ICD-10-CM

## 2021-02-19 RX ORDER — PEDI MV NO.227/FERROUS SULFATE 10 MG
1 TABLET,CHEWABLE ORAL DAILY
Qty: 90 TABLET | Refills: 3 | Status: SHIPPED | OUTPATIENT
Start: 2021-02-19

## 2021-02-19 NOTE — TELEPHONE ENCOUNTER
Name of Caller:  Don Pimentel    Relationship to patient: self    Best contact number: [phone]    Reason for call: Patient states that she received the wrong type of medication, she received  Multiple Vitamins-Minerals (MULTIVITAMIN WITH MINERALS) tablet, instead of the Pediatric Multivitamins-Iron (FLINTSTONES PLUS IRON) CHEW  that that she normal takes. She is asking for a return call to discuss what can be done about this mix up.     Thank you

## 2021-02-19 NOTE — TELEPHONE ENCOUNTER
I informed new med was sent to the pharmacy. She says she got diarrhea, Cannot take other type of multivitamins  \"Flintstones don't irritate my stomach\"    I explained to her that I could not order the old Flintstones and she understood: \"The following medication records are no longer available for ordering. Place a new order with a different medication record.   Pediatric Multivitamins-Iron (FLINTSTONES PLUS IRON) CHEW\"    Orders Placed This Encounter   Medications    Pediatric Multivitamins-Iron (FLINTSTONES COMPLETE) 18 MG CHEW     Sig: Take 1 tablet by mouth daily     Dispense:  90 tablet     Refill:  3     She was happy that Tana Haynes will help her with transportation    Future Appointments   Date Time Provider Candie Barksdale   5/20/2021  3:45 PM Steve Hernandez MD fp [facility]

## 2021-02-22 ENCOUNTER — CARE COORDINATION (OUTPATIENT)
Dept: CARE COORDINATION | Age: 51
End: 2021-02-22

## 2021-02-22 ENCOUNTER — TELEPHONE (OUTPATIENT)
Dept: FAMILY MEDICINE CLINIC | Age: 51
End: 2021-02-22

## 2021-02-22 DIAGNOSIS — B37.31 YEAST VAGINITIS: Primary | ICD-10-CM

## 2021-02-22 NOTE — CARE COORDINATION
HC phoned [facility] and spoke to a representative to inquire of difficulties in getting patient to medical appointments. HC was told that they only had one ride arranged for patient in 2021, on 01/28/21 and they Luana Coyle had no available drivers at the time. Representative stated that they didn't shown any other reservations for this patient since that time. HC explored the [address] [facility] [address] that this patient had inquired about and found that Medical Transportation will have to be completed for patient. Plan of Care  [facility] [place], [facility]. will follow up with patient with the above mentioned information.

## 2021-02-23 ENCOUNTER — CARE COORDINATION (OUTPATIENT)
Dept: CARE COORDINATION | Age: 51
End: 2021-02-23

## 2021-02-23 NOTE — CARE COORDINATION
Attempted to contact patient today, regarding medical transportation. There was no answer. HC left message for patient and contact information for a return call. Plan of Care  [facility], [facility]. will expect a return call from patient. If no response by 02/24/21 [facility]. will reach out to patient again.

## 2021-02-25 ENCOUNTER — CARE COORDINATION (OUTPATIENT)
Dept: CARE COORDINATION | Age: 51
End: 2021-02-25

## 2021-03-01 ENCOUNTER — CARE COORDINATION (OUTPATIENT)
Dept: CARE COORDINATION | Age: 51
End: 2021-03-01

## 2021-03-01 NOTE — TELEPHONE ENCOUNTER
PLEASE CHECK THE FORMS. IF MORE THAN 1 PAGE FORMS, PLEASE MAKE HER AN APPOINTMENT TELEPHONE ONLY TO FILL THE FORMS.        (MIGHT BE THE LONG VERSION FORM WHICH MIGHT HAVE LIKE 12 PAGES, IT'S NOT SCANNED IN THE CHART)

## 2021-03-02 ENCOUNTER — CARE COORDINATION (OUTPATIENT)
Dept: CARE COORDINATION | Age: 51
End: 2021-03-02

## 2021-03-02 NOTE — CARE COORDINATION
PCP faxed completed completed NET paperwork back to [facility]..   [facility]. faxed completed NET application to [employer] @ [facility], for approval.    Plan of Care    [facility] will follow up with [employer] in 10 days for approval

## 2021-03-12 ENCOUNTER — CARE COORDINATION (OUTPATIENT)
Dept: CARE COORDINATION | Age: 51
End: 2021-03-12

## 2021-03-12 NOTE — CARE COORDINATION
[facility]. phoned [facility]/Transportation department to follow up on patients approval for medical transportation. HC was informed that the patient has been approved for transportation. HC phoned the patient to share with her that she's now approved for medical transportation.  offered the phone number for [employer], patient stated that she already has the phone number. HC will follow up with patient to inform her that she will have to renew her application on an annual basis.     Plan of Care  [facility] will follow up with patient on her needed referral

## 2021-03-26 ENCOUNTER — CARE COORDINATION (OUTPATIENT)
Dept: CARE COORDINATION | Age: 51
End: 2021-03-26

## 2021-03-26 NOTE — CARE COORDINATION
Patients goal has been met. HC will close sign off of this patient at this time. HC will assist patient with other social needs when needed.     Plan of Care     [facility]. signing off of patient

## 2021-05-06 DIAGNOSIS — F31.9 BIPOLAR DEPRESSION (HCC): ICD-10-CM

## 2021-05-06 RX ORDER — MIRTAZAPINE 30 MG/1
30 TABLET, FILM COATED ORAL NIGHTLY
Qty: 90 TABLET | Refills: 0 | Status: SHIPPED | OUTPATIENT
Start: 2021-05-06

## 2021-05-06 RX ORDER — FLUOXETINE HYDROCHLORIDE 40 MG/1
40 CAPSULE ORAL DAILY
Qty: 90 CAPSULE | Refills: 3 | Status: SHIPPED | OUTPATIENT
Start: 2021-05-06

## 2021-05-20 ENCOUNTER — OFFICE VISIT (OUTPATIENT)
Dept: FAMILY MEDICINE CLINIC | Age: 51
End: 2021-05-20
Payer: COMMERCIAL

## 2021-05-20 VITALS
WEIGHT: 179.4 LBS | BODY MASS INDEX: 25.11 KG/M2 | SYSTOLIC BLOOD PRESSURE: 116 MMHG | TEMPERATURE: 98.2 F | DIASTOLIC BLOOD PRESSURE: 78 MMHG | HEIGHT: 71 IN | HEART RATE: 89 BPM | OXYGEN SATURATION: 98 %

## 2021-05-20 DIAGNOSIS — F31.9 BIPOLAR DEPRESSION (HCC): ICD-10-CM

## 2021-05-20 DIAGNOSIS — Z12.31 ENCOUNTER FOR SCREENING MAMMOGRAM FOR BREAST CANCER: ICD-10-CM

## 2021-05-20 DIAGNOSIS — I10 ESSENTIAL HYPERTENSION: ICD-10-CM

## 2021-05-20 DIAGNOSIS — Z12.11 COLON CANCER SCREENING: ICD-10-CM

## 2021-05-20 DIAGNOSIS — K04.7 TOOTH INFECTION: ICD-10-CM

## 2021-05-20 DIAGNOSIS — Z13.6 SCREENING FOR CARDIOVASCULAR CONDITION: ICD-10-CM

## 2021-05-20 DIAGNOSIS — Z00.00 ANNUAL PHYSICAL EXAM: Primary | ICD-10-CM

## 2021-05-20 DIAGNOSIS — Z11.59 ENCOUNTER FOR SCREENING FOR OTHER VIRAL DISEASES: ICD-10-CM

## 2021-05-20 DIAGNOSIS — H57.9 ABNORMAL VISION SCREEN: ICD-10-CM

## 2021-05-20 DIAGNOSIS — E55.9 VITAMIN D DEFICIENCY: ICD-10-CM

## 2021-05-20 PROCEDURE — 99396 PREV VISIT EST AGE 40-64: CPT | Performed by: FAMILY MEDICINE

## 2021-05-20 RX ORDER — CLINDAMYCIN HYDROCHLORIDE 300 MG/1
300 CAPSULE ORAL 3 TIMES DAILY
Qty: 21 CAPSULE | Refills: 0 | Status: SHIPPED | OUTPATIENT
Start: 2021-05-20 | End: 2021-05-27

## 2021-05-20 RX ORDER — AMLODIPINE BESYLATE 5 MG/1
5 TABLET ORAL DAILY
Qty: 90 TABLET | Refills: 3 | Status: SHIPPED | OUTPATIENT
Start: 2021-05-20

## 2021-05-20 ASSESSMENT — ENCOUNTER SYMPTOMS
DIARRHEA: 0
WHEEZING: 0
VOMITING: 0
SHORTNESS OF BREATH: 0
CHEST TIGHTNESS: 0
CONSTIPATION: 0
COUGH: 0
ABDOMINAL PAIN: 0
NAUSEA: 0
BLOOD IN STOOL: 0
ABDOMINAL DISTENTION: 0
BACK PAIN: 0

## 2021-05-20 ASSESSMENT — PATIENT HEALTH QUESTIONNAIRE - PHQ9: SUM OF ALL RESPONSES TO PHQ QUESTIONS 1-9: 0

## 2021-05-20 NOTE — PROGRESS NOTES
Visit Information    Have you changed or started any medications since your last visit including any over-the-counter medicines, vitamins, or herbal medicines? no   Are you having any side effects from any of your medications? -  no  Have you stopped taking any of your medications? Is so, why? -  no    Have you seen any other physician or provider since your last visit? Yes - Records Obtained  Have you had any other diagnostic tests since your last visit? No  Have you been seen in the emergency room and/or had an admission to a hospital since we last saw you? No  Have you had your routine dental cleaning in the past 6 months? no    Have you activated your [employer] account? If not, what are your barriers?  No:      Patient Care Team:  Chikis Govea MD as PCP - General (Family Medicine)  Chikis Govea MD as PCP - [facility] Provider  Perez Puente MD as Consulting Physician (Hematology and Oncology)  Claudia Finnegan MD as Consulting Physician (Gastroenterology)    Medical History Review  Past Medical, Family, and Social History reviewed and does contribute to the patient presenting condition    Health Maintenance   Topic Date Due    Hepatitis C screen  Never done    COVID-19 Vaccine (1) Never done    Potassium monitoring  06/14/2020    Creatinine monitoring  06/14/2020    Breast cancer screen  07/20/2020    Shingles Vaccine (1 of 2) Never done    Colon cancer screen colonoscopy  Never done    Flu vaccine (Season Ended) 09/01/2021    Lipid screen  06/14/2024    DTaP/Tdap/Td vaccine (2 - Td) 04/20/2028    Pneumococcal 0-64 years Vaccine (2 of 2) 07/20/2035    HIV screen  Completed    Hepatitis A vaccine  Aged Out    Hepatitis B vaccine  Aged Out    Hib vaccine  Aged Out    Meningococcal (ACWY) vaccine  Aged Out

## 2021-05-20 NOTE — PATIENT INSTRUCTIONS
Patient Education       PFIZER    Schedule a Vaccine  When you qualify to receive the vaccine, call the [facility]) COVID-19 Vaccination Hotline to schedule your appointment or to get additional information about the [facility]) locations which are offering the COVID-19 vaccine. To be 94% effective, it's important that you receive two doses of one of the COVID-19 vaccines. -If you are receiving the Avila Peter vaccine, your second shot will be scheduled as close to 21 days after the first shot as possible. -If you are receiving the Moderna vaccine, your second shot will be scheduled as close to 28 days after the first shot as possible. [facility]) COVID-19 Vaccination Hotline: [phone]    Links to [facility]) website and [facility] website:    [employer]/[facility]-monitoring-coronavirus-covid-19/covid-19-vaccine/[place]/mas-vaccine    [email or web address]           Well Visit, Ages 25 to 48: Care Instructions  Overview     Well visits can help you stay healthy. Your doctor has checked your overall health and may have suggested ways to take good care of yourself. Your doctor also may have recommended tests. At home, you can help prevent illness with healthy eating, regular exercise, and other steps. Follow-up care is a key part of your treatment and safety. Be sure to make and go to all appointments, and call your doctor if you are having problems. It's also a good idea to know your test results and keep a list of the medicines you take. How can you care for yourself at home? · Get screening tests that you and your doctor decide on. Screening helps find diseases before any symptoms appear. · Eat healthy foods. Choose fruits, vegetables, whole grains, protein, and low-fat dairy foods. Limit fat, especially saturated fat. Reduce salt in your diet. · Limit alcohol. If you are a man, have no more than 2 drinks a day or 14 drinks a week.  If you are a woman, have no more than 1 drink a day or 7 drinks a week. · Get at least 30 minutes of physical activity on most days of the week. Walking is a good choice. You also may want to do other activities, such as running, swimming, cycling, or playing tennis or team sports. Discuss any changes in your exercise program with your doctor. · Reach and stay at a healthy weight. This will lower your risk for many problems, such as obesity, diabetes, heart disease, and high blood pressure. · Do not smoke or allow others to smoke around you. If you need help quitting, talk to your doctor about stop-smoking programs and medicines. These can increase your chances of quitting for good. · Care for your mental health. It is easy to get weighed down by worry and stress. Learn strategies to manage stress, like deep breathing and mindfulness, and stay connected with your family and community. If you find you often feel sad or hopeless, talk with your doctor. Treatment can help. · Talk to your doctor about whether you have any risk factors for sexually transmitted infections (STIs). You can help prevent STIs if you wait to have sex with a new partner (or partners) until you've each been tested for STIs. It also helps if you use condoms (male or female condoms) and if you limit your sex partners to one person who only has sex with you. Vaccines are available for some STIs, such as HPV. · Use birth control if it's important to you to prevent pregnancy. Talk with your doctor about the choices available and what might be best for you. · If you think you may have a problem with alcohol or drug use, talk to your doctor. This includes prescription medicines (such as amphetamines and opioids) and illegal drugs (such as cocaine and methamphetamine). Your doctor can help you figure out what type of treatment is best for you. · Protect your skin from too much sun.  When you're outdoors from 10 a.m. to 4 p.m., stay in the shade or cover up with clothing and a hat with a wide brim. Wear sunglasses that block UV rays. Even when it's cloudy, put broad-spectrum sunscreen (SPF 30 or higher) on any exposed skin. · See a dentist one or two times a year for checkups and to have your teeth cleaned. · Wear a seat belt in the car. When should you call for help? Watch closely for changes in your health, and be sure to contact your doctor if you have any problems or symptoms that concern you. Where can you learn more? Go to [email or web address] org and sign in to your [employer] account. Enter P072 in the [employer] box to learn more about \"Well Visit, Ages 25 to 48: Care Instructions. \"     If you do not have an account, please click on the \"Sign Up Now\" link. Current as of: May 27, 2020               Content Version: 12.8  © 9261-1909 [employer]. Care instructions adapted under license by [facility] ([facility]). If you have questions about a medical condition or this instruction, always ask your healthcare professional. [place] [address] any warranty or liability for your use of this information.

## 2021-05-20 NOTE — PROGRESS NOTES
2021      Don Pimentel (:  1970) is a 48 y.o. female, here for a preventive medicine evaluation, Annual Exam, Hypertension, Asthma, and Dental Pain   . ASSESSMENT/PLAN:    1. Annual physical exam    Low carb, low fat diet, increase fruits and vegetables, and exercise 4-5 times a week 30-40 minutes a day, or walk 1-2 hours per day, or wear a pedometer and get at least 10,000 steps per day. Dental exam 2-3 times /year advised. Immunizations reviewed. Health Maintenance reviewed   Smoking cessation counseling given     Annual eye exam advised. Had blood clots, discussed Pfizer vaccine, she will get it at her own time or call our hot line. 2. Tooth infection  Failing to change as expected. follow up with dentist when scheduled     -  start  clindamycin (CLEOCIN) 300 MG capsule; Take 1 capsule by mouth 3 times daily for 7 days, Disp-21 capsule, R-0Normal  3. Abnormal vision screen  worsening   -     AFL - Ariadna Payton MD, Ophthalmology, [place]  4. Essential hypertension  Well controlled. Continue current treatment. Will recheck labs. Discussed low salt diet and BP and pulse monitoring.    -     CBC; Future  -     Comprehensive Metabolic Panel; Future  -     TSH without Reflex; Future  -     amLODIPine (NORVASC) 5 MG tablet; Take 1 tablet by mouth daily, Disp-90 tablet, R-3Normal  5. Vitamin D deficiency  Unsure if improving or not. Will recheck level  Continue supplementation.    -     Vitamin D 25 Hydroxy; Future  6. Bipolar depression ([facility] [address]  Improved  Continue current treatment. Will continue to monitor. She is not going to [facility]    7. Encounter for screening mammogram for breast cancer  -     KEVON JAYDEN DIGITAL SCREEN BILATERAL; Future  8. Colon cancer screening  -     POCT Fecal Immunochemical Test (FIT); Future  9. Encounter for screening for other viral diseases  -     Hepatitis C Antibody; Future  10. Screening for cardiovascular condition  -     Lipid Panel;  Future Don received counseling on the following healthy behaviors: nutrition, exercise, medication adherence and tobacco cessation  Reviewed prior labs and health maintenance  Discussed use, benefit, and side effects of prescribed medications. Barriers to medication compliance addressed. Patient given educational materials - see patient instructions  All patient questions answered. Patient voiced understanding. The patient's past medical, surgical, social, and family history as well as her  current medications and allergies were reviewed as documented in today's encounter. Medications, labs, diagnostic studies, consultations and follow-up as documented in thisencounter. Return in about 6 months (around 11/20/2021) for ALWAYS NEEDS 30 MIN. APPT, HTN, LABS F/U. First in the session, no waiting      Future Appointments   Date Time Provider Candie Barksdale   12/3/2021  1:00 PM Sage Goff MD [facility]           Patient Active Problem List   Diagnosis    Chronic fatigue    Moderate persistent asthma without complication    Gastroesophageal reflux disease without esophagitis    Murmur, cardiac    History of DVT (deep vein thrombosis) x 2    Presence of IVC filter    History of anaphylaxis    Essential hypertension    Irritable bowel syndrome with diarrhea    Ex-smoker    Chronic tension-type headache, intractable    Psychophysiological insomnia    Anemia    Bipolar depression (HCC)    Tinea pedis of both feet    Onychomycosis of great toe    Vitamin D deficiency    Pain due to onychomycosis of toenail    Dyspareunia, female    Abnormal vision screen       Prior to Visit Medications    Medication Sig Taking?  Authorizing Provider   mirtazapine (REMERON) 30 MG tablet TAKE 1 TABLET BY MOUTH NIGHTLY Yes Sage Goff MD   FLUoxetine (PROZAC) 40 MG capsule TAKE 1 CAPSULE BY MOUTH DAILY INTERACTS WITH IBUPROFEN, CAN INCREASE THE RISK OF GI ULCERS AND GASTRITIS Yes Marilyn Lira MD   Pediatric Multivitamins-Iron (FLINTSTONES COMPLETE) 18 MG CHEW Take 1 tablet by mouth daily Yes Marilyn Lira MD   albuterol sulfate HFA (PROAIR HFA) 108 (90 Base) MCG/ACT inhaler Inhale 2 puffs into the lungs every 6 hours as needed for Wheezing or Shortness of Breath (cough) Yes Marilyn Lira MD   fluticasone (FLOVENT HFA) 220 MCG/ACT inhaler Inhale 2 puffs into the lungs 2 times daily Rinse mouth after using Yes Marilyn Lira MD   amLODIPine (NORVASC) 5 MG tablet Take 1 tablet by mouth daily Yes Marilyn Lira MD   mupirocin (BACTROBAN) 2 % cream APPLY 3 TIMES DAILY X 7 DAYS Yes Marilyn Lira MD   Blood Pressure KIT Diagnosis: HTN. Needs to check blood pressure 1-2 times a day until stable, then once a day. Goal blood pressure less than 135/85, and above 110/60.  Yes Marilyn Lira MD   albuterol (PROVENTIL) (2.5 MG/3ML) 0.083% nebulizer solution Take 3 mLs by nebulization every 6 hours as needed for Wheezing Yes Shanice Salgado MD   Vaginal Lubricant (VAGISIL LUBRICANT) GEL Apply at nighttime every evening , long term  Patient not taking: Reported on 5/20/2021  Marilyn Lira MD   acetaminophen ( PAIN RELIEF EXTRA STRENGTH) 500 MG tablet TAKE 1 TABLET BY MOUTH EVERY 6 HOURS AS NEEDED FOR PAIN  Patient not taking: Reported on 5/20/2021  Marilyn Lira MD   vitamin D (ERGOCALCIFEROL) 71220 units CAPS capsule Take 1 capsule by mouth once a week  Patient not taking: Reported on 5/20/2021  Marilyn Lira MD   EPINEPHrine (EPIPEN 2-MAGDI) 0.3 MG/0.3ML SOAJ injection Inject 0.3 mLs into the muscle once as needed And call [address], MD        Allergies   Allergen Reactions    Bee Venom Swelling     Leg swelling, black and blue    Penicillins Shortness Of Breath and Swelling     At 3 yo    Flagyl [Metronidazole] Nausea And Vomiting    Nickel Rash       Past Medical History:   Diagnosis Date    Abdominal pain     Acute pain of left shoulder 7/21/2017    Asthma 1981    since 6yo    Bipolar disorder ([address] 2016    established w/ BLAZESON, was started on Seroquel    Candidal vaginitis 3/27/2017    Chronic tension-type headache, intractable 1/4/2018    Constipation     Diarrhea     DVT (deep vein thrombosis) in pregnancy     x 2, has IVC filter, was on Coumadin before    Elevated antinuclear antibody (TIMMY) level 1/12/2016    history of      Essential hypertension 3/27/2017    GERD (gastroesophageal reflux disease)     Hyperlipidemia     Hypertension     Iron deficiency anemia 2/15/2016    Iron deficiency anemia due to chronic blood loss 3/22/2017    Iron deficiency anemia due to chronic blood loss 3/22/2017    Gastritis    Irritable bowel syndrome with diarrhea     Lupus ([address]     possible per verbal report, had elevated TIMMY,  she failed to f/u     Moderate episode of recurrent major depressive disorder ([address] 10/29/2017    Nondisplaced fracture of proximal phalanx of left great toe, initial encounter for closed fracture 8/6/2018    Obesity (BMI 30.0-34.9) 7/21/2017    Positive TIMMY (antinuclear antibody) 2001    PUD (peptic ulcer disease)     h/o    Rapid heart rate     Recurrent major depressive disorder, in partial remission ([address] 1/4/2018    Has bipolar depression     Single skin nodule LEFT MEDIAL THIGH  4/28/2018       Past Surgical History:   Procedure Laterality Date    HYSTERECTOMY  2001    for fibroids, left ovarian and subtotal hysterectomy; per exam on 4/14/16-TOTAL HYST!  VENA CAVA FILTER PLACEMENT         . Social History     Tobacco Use    Smoking status: Current Every Day Smoker     Packs/day: 0.50     Years: 18.00     Pack years: 9.00     Types: Cigarettes     Start date: 1/1/1996     Last attempt to quit: 10/9/2017     Years since quitting: 3.6    Smokeless tobacco: Never Used    Tobacco comment: 1/2-1 PPD, quit smoking 2 weeks ago per my note from 10/23/17   Substance Use Topics    Alcohol use:  No Alcohol/week: 0.0 standard drinks     Comment: quit smoking with patches    Drug use: Yes     Types: Marijuana       Family History   Problem Relation Age of Onset    Anemia Sister     Cancer Maternal Grandmother         breast cancer    Cancer Maternal Grandfather         liver cancer    Cancer Maternal Aunt         stomach cancer       ADVANCE DIRECTIVE: N, <no information>    SUBJECTIVE / María Screen is here for Annual Exam, Hypertension, Asthma, and Dental Pain     Tooth infection, left  upper ,for several months, has appointment next TUE, not getting better, she was told she needs root canal.  Has been taking a lot of tylenol, about 8 tabs a day    Urinary symptoms: no    Sexually active: Yes     Had hysterectomy  The patient has NO history of abnormal PAP    Last mammogram:1/19/18  The patient has  family history of breast cancer    Wears seatbelts: yes     Regular exercise: yes, staying active. Riding bike    Ever been transfused or tattooed?: yes  The patient reports that domestic violence in her life is absent. Last eye exam: up to date: No    Abnormal visual screening. I advised Don Pimentel to make appointment with ophthalmologist, I made a referal.  The patient verbalizes understanding and agrees with the plan. Hearing Screening    125Hz 250Hz 500Hz 1000Hz 2000Hz 3000Hz 4000Hz 6000Hz 8000Hz   Right ear:            Left ear:               Visual Acuity Screening    Right eye Left eye Both eyes   Without correction: 20/50 20/50 20/40   With correction:          Hearing:normal :Yes    Last dental exam and preventative dental cleaning: up to date : Yes  Has appointment      Nutritional assessment: Body mass index is 25.02 kg/m².   Normal.     Weight is decreasing intentionally   Wt Readings from Last 3 Encounters:   05/20/21 179 lb 6.4 oz (81.4 kg)   05/29/20 179 lb (81.2 kg)   02/20/20 184 lb (83.5 kg)       Healthful diet and Physical activity counseling to prevent CVD- low carb, low fat diet, increase fruits and vegetables, and exercise 4-5 times a day 30-40minutes a day discussed    Nicotine dependence. yes   Smoker, counseling given to quit smoking. Ready to quit: Yes  Counseling given: Yes  Comment: 1/2-1 PPD, quit smoking 2 weeks ago per my note from 10/23/17      Alcohol use: no    Immunization History   Administered Date(s) Administered    Pneumococcal Polysaccharide ([id number]) 04/14/2016    Tdap (Boostrix, Adacel) 04/20/2018       Tdap one time, then Td every 10 years-up to date:  Yes    Influenza annually-up to date:  No:  Out of season    PPSV 23 in all adults 19-63 yo with chronic conditions,smokers, alcoholism,  nursing home residents; then PCV 13 at 71 yo-up to date: Yes      Menopause: Yes   No LMP recorded (lmp unknown). Patient has had a hysterectomy. Colon cancer screening recommended at 49 yo-overdue  The patient has  NO  family history of colon cancer      HTN  Reports mild fatigue.   Denies chest pain, chest pressure/discomfort, claudication, dyspnea, exertional chest pressure/discomfort, irregular heart beat, lower extremity edema, near-syncope, orthopnea, palpitations, paroxysmal nocturnal dyspnea, syncope and tachypnea    Blood pressure is normal.  BP Readings from Last 3 Encounters:   05/20/21 116/78   05/29/20 116/74   02/20/20 120/84       Pulse is normal.  Pulse Readings from Last 3 Encounters:   05/20/21 89   05/29/20 66   02/20/20 63       Lipid screening -within normal limits     Lab Results   Component Value Date    CHOL 141 06/14/2019    CHOL 172 05/09/2017    CHOL 148 03/22/2017     Lab Results   Component Value Date    TRIG 66 06/14/2019    TRIG 119 05/09/2017    TRIG 76 03/22/2017     Lab Results   Component Value Date    HDL 56 06/14/2019    HDL 61 05/09/2017    HDL 48 03/22/2017     Lab Results   Component Value Date    LDLCHOLESTEROL 72 06/14/2019    LDLCHOLESTEROL 87 05/09/2017    LDLCHOLESTEROL 85 03/22/2017     Lab Results   Component Value Date    CHOLHDLRATIO 2.5 06/14/2019    CHOLHDLRATIO 2.8 05/09/2017    CHOLHDLRATIO 3.1 03/22/2017       Cardiovascular risk is: low    The 10-year ASCVD risk score (Tony Ojeda et al., 2013) is: 3.2%    Values used to calculate the score:      Age: 48 years      Sex: Female      Is Non- : Yes      Diabetic: No      Tobacco smoker: Yes      Systolic Blood Pressure: 685 mmHg      Is BP treated: Yes      HDL Cholesterol: 56 mg/dL      Total Cholesterol: 141 mg/dL    Hepatitis C screening- No results found for: HAV, HEPAIGM, HEPBIGM, HEPBCAB, HBEAG, HEPCAB      Bipolar disorder  Reports compliance with medication. Denies suicidal ideation, plan or intent. PHQ-2 Over the past 2 weeks, how often have you been bothered by any of the following problems? Little interest or pleasure in doing things: Not at all  Feeling down, depressed, or hopeless: Not at all  PHQ-2 Score: 0  PHQ-9 Over the past 2 weeks, how often have you been bothered by any of the following problems? PHQ-9 Total Score: 0      PHQ Scores 5/20/2021 2/17/2021 5/29/2020 2/20/2020 6/14/2019 3/7/2019 8/1/2018   PHQ2 Score 0 4 0 2 2 2 3   PHQ9 Score 0 23 0 2 2 6 10       Don has Vitamin D deficiency. Don  is  taking Vitamin D supplementation   she feels tired.     Lab Results   Component Value Date    VITD25 27.0 (L) 06/14/2019         Health Maintenance   Topic Date Due    Hepatitis C screen  Never done    COVID-19 Vaccine (1) Never done    Potassium monitoring  06/14/2020    Creatinine monitoring  06/14/2020    Breast cancer screen  07/20/2020    Shingles Vaccine (1 of 2) Never done    Colon cancer screen colonoscopy  Never done    Flu vaccine (Season Ended) 09/01/2021    Lipid screen  06/14/2024    DTaP/Tdap/Td vaccine (2 - Td) 04/20/2028    Pneumococcal 0-64 years Vaccine (2 of 2) 07/20/2035    HIV screen  Completed    Hepatitis A vaccine  Aged Out    Hepatitis B vaccine  Aged Out    Hib vaccine  Aged Out    Meningococcal (ACWY) vaccine  Aged Out       -rest of complaints with corresponding details per ROS    Review of Systems   Constitutional: Positive for fatigue. Negative for activity change, appetite change, chills, diaphoresis, fever and unexpected weight change. HENT: Positive for dental problem. Negative for congestion and hearing loss. Eyes: Positive for visual disturbance. Respiratory: Negative for cough, chest tightness, shortness of breath and wheezing. Cardiovascular: Negative for chest pain, palpitations and leg swelling. Gastrointestinal: Negative for abdominal distention, abdominal pain, blood in stool, constipation, diarrhea, nausea and vomiting. Endocrine: Negative for cold intolerance, heat intolerance, polydipsia, polyphagia and polyuria. Genitourinary: Negative for difficulty urinating, dysuria, frequency, urgency and vaginal pain. Musculoskeletal: Negative for arthralgias, back pain and myalgias. Skin: Negative for rash. Neurological: Negative for dizziness, weakness and numbness. Hematological: Does not bruise/bleed easily. Psychiatric/Behavioral: Negative for dysphoric mood and sleep disturbance. The patient is nervous/anxious.          -vital signs stable and within normal limits   /78   Pulse 89   Temp 98.2 °F (36.8 °C) (Temporal)   Ht 5' 11\" (1.803 m)   Wt 179 lb 6.4 oz (81.4 kg)   LMP  (LMP Unknown)   SpO2 98%   BMI 25.02 kg/m²   Vitals:    05/20/21 1546   BP: 116/78   Pulse: 89   Temp: 98.2 °F (36.8 °C)   TempSrc: Temporal   SpO2: 98%   Weight: 179 lb 6.4 oz (81.4 kg)   Height: 5' 11\" (1.803 m)     Estimated body mass index is 25.02 kg/m² as calculated from the following:    Height as of this encounter: 5' 11\" (1.803 m). Weight as of this encounter: 179 lb 6.4 oz (81.4 kg). Physical Exam  Vitals and nursing note reviewed. Constitutional:       General: She is not in acute distress. Appearance: Normal appearance.  She is well-developed. She is not diaphoretic. HENT:      Head: Normocephalic and atraumatic. Right Ear: Hearing, tympanic membrane, ear canal and external ear normal.      Left Ear: Hearing, tympanic membrane, ear canal and external ear normal.      Nose: No mucosal edema. Mouth/Throat:      Dentition: Abnormal dentition. Eyes:      General: Lids are normal. No scleral icterus. Right eye: No discharge. Left eye: No discharge. Extraocular Movements: Extraocular movements intact. Conjunctiva/sclera: Conjunctivae normal.   Neck:      Thyroid: No thyromegaly. Cardiovascular:      Rate and Rhythm: Normal rate and regular rhythm. Heart sounds: Normal heart sounds. No murmur heard. Pulmonary:      Effort: Pulmonary effort is normal. No respiratory distress. Breath sounds: Normal breath sounds. No wheezing or rales. Chest:      Chest wall: No tenderness. Abdominal:      General: Bowel sounds are normal. There is no distension. Palpations: Abdomen is soft. There is no hepatomegaly or splenomegaly. Tenderness: There is no abdominal tenderness. Musculoskeletal:         General: No tenderness. Normal range of motion. Cervical back: Normal range of motion and neck supple. Right lower leg: No edema. Left lower leg: No edema. Skin:     General: Skin is warm and dry. Capillary Refill: Capillary refill takes less than 2 seconds. Findings: No rash. Neurological:      Mental Status: She is alert and oriented to person, place, and time. Cranial Nerves: No cranial nerve deficit. Motor: No abnormal muscle tone. Gait: Gait normal.      Deep Tendon Reflexes: Reflexes normal.      Reflex Scores:       Patellar reflexes are 2+ on the right side and 2+ on the left side. Psychiatric:         Mood and Affect: Mood is anxious. Behavior: Behavior normal.         Thought Content:  Thought content normal.         Judgment: Judgment normal.           Orders Placed This Encounter   Medications    clindamycin (CLEOCIN) 300 MG capsule     Sig: Take 1 capsule by mouth 3 times daily for 7 days     Dispense:  21 capsule     Refill:  0    amLODIPine (NORVASC) 5 MG tablet     Sig: Take 1 tablet by mouth daily     Dispense:  90 tablet     Refill:  3         Medications Discontinued During This Encounter   Medication Reason    miconazole (MONISTAT 1 COMBINATION PACK) 200 & 2 MG-% (9GM) KIT kit     amLODIPine (NORVASC) 5 MG tablet REORDER         Orders Placed This Encounter   Procedures    KEVON JAYDEN DIGITAL SCREEN BILATERAL     Standing Status:   Future     Standing Expiration Date:   7/20/2022    CBC     Standing Status:   Future     Standing Expiration Date:   7/9/2021    Comprehensive Metabolic Panel     Standing Status:   Future     Standing Expiration Date:   7/9/2021    Lipid Panel     Standing Status:   Future     Standing Expiration Date:   7/9/2021     Order Specific Question:   Is Patient Fasting?/# of Hours     Answer:   8-10 Hours, water ok to drink    TSH without Reflex     Standing Status:   Future     Standing Expiration Date:   7/9/2021    Vitamin D 25 Hydroxy     Standing Status:   Future     Standing Expiration Date:   7/9/2021    Hepatitis C Antibody     Standing Status:   Future     Standing Expiration Date:   7/9/2021    BLANKA Blair MD, Ophthalmology, [place]     Referral Priority:   Routine     Referral Type:   Eval and Treat     Referral Reason:   Specialty Services Required     Referred to Provider:   Celestino Marquez MD     Requested Specialty:   Ophthalmology     Number of Visits Requested:   1    POCT Fecal Immunochemical Test (FIT)     Standing Status:   Future     Standing Expiration Date:   5/20/2022         This note was completed by using the assistance of a speech-recognition program. However, inadvertent computerized transcription errors may be present.  Although every effort was made to ensure accuracy, no guarantees can be provided that every mistake has been identified and corrected by editing. An electronic signature was used to authenticate this note.     Electronically signed by Phill Gates MD on 5/20/2021 at 6:58 PM